# Patient Record
Sex: FEMALE | Race: BLACK OR AFRICAN AMERICAN | Employment: UNEMPLOYED | URBAN - METROPOLITAN AREA
[De-identification: names, ages, dates, MRNs, and addresses within clinical notes are randomized per-mention and may not be internally consistent; named-entity substitution may affect disease eponyms.]

---

## 2018-04-07 ENCOUNTER — HOSPITAL ENCOUNTER (INPATIENT)
Age: 48
LOS: 10 days | Discharge: HOME | DRG: 885 | End: 2018-04-17
Attending: EMERGENCY MEDICINE | Admitting: PSYCHIATRY & NEUROLOGY
Payer: MEDICARE

## 2018-04-07 DIAGNOSIS — R44.0 AUDITORY HALLUCINATION: Primary | ICD-10-CM

## 2018-04-07 PROBLEM — F25.9 SCHIZOAFFECTIVE DISORDER (HCC): Status: ACTIVE | Noted: 2018-04-07

## 2018-04-07 LAB
AMPHET UR QL SCN: NEGATIVE
ANION GAP SERPL CALC-SCNC: 9 MMOL/L (ref 3–18)
BARBITURATES UR QL SCN: NEGATIVE
BASOPHILS # BLD: 0 K/UL (ref 0–0.1)
BASOPHILS NFR BLD: 0 % (ref 0–2)
BENZODIAZ UR QL: NEGATIVE
BUN SERPL-MCNC: 10 MG/DL (ref 7–18)
BUN/CREAT SERPL: 10 (ref 12–20)
CALCIUM SERPL-MCNC: 9 MG/DL (ref 8.5–10.1)
CANNABINOIDS UR QL SCN: NEGATIVE
CHLORIDE SERPL-SCNC: 104 MMOL/L (ref 100–108)
CO2 SERPL-SCNC: 24 MMOL/L (ref 21–32)
COCAINE UR QL SCN: NEGATIVE
CREAT SERPL-MCNC: 1.04 MG/DL (ref 0.6–1.3)
DIFFERENTIAL METHOD BLD: NORMAL
EOSINOPHIL # BLD: 0.1 K/UL (ref 0–0.4)
EOSINOPHIL NFR BLD: 3 % (ref 0–5)
ERYTHROCYTE [DISTWIDTH] IN BLOOD BY AUTOMATED COUNT: 13.3 % (ref 11.6–14.5)
ETHANOL SERPL-MCNC: <3 MG/DL (ref 0–3)
GLUCOSE SERPL-MCNC: 90 MG/DL (ref 74–99)
HCT VFR BLD AUTO: 39.4 % (ref 35–45)
HDSCOM,HDSCOM: NORMAL
HGB BLD-MCNC: 12.8 G/DL (ref 12–16)
LYMPHOCYTES # BLD: 2.3 K/UL (ref 0.9–3.6)
LYMPHOCYTES NFR BLD: 46 % (ref 21–52)
MCH RBC QN AUTO: 28.3 PG (ref 24–34)
MCHC RBC AUTO-ENTMCNC: 32.5 G/DL (ref 31–37)
MCV RBC AUTO: 87 FL (ref 74–97)
METHADONE UR QL: NEGATIVE
MONOCYTES # BLD: 0.4 K/UL (ref 0.05–1.2)
MONOCYTES NFR BLD: 9 % (ref 3–10)
NEUTS SEG # BLD: 2 K/UL (ref 1.8–8)
NEUTS SEG NFR BLD: 42 % (ref 40–73)
OPIATES UR QL: NEGATIVE
PCP UR QL: NEGATIVE
PLATELET # BLD AUTO: 322 K/UL (ref 135–420)
PMV BLD AUTO: 10 FL (ref 9.2–11.8)
POTASSIUM SERPL-SCNC: 3.9 MMOL/L (ref 3.5–5.5)
RBC # BLD AUTO: 4.53 M/UL (ref 4.2–5.3)
SODIUM SERPL-SCNC: 137 MMOL/L (ref 136–145)
WBC # BLD AUTO: 4.9 K/UL (ref 4.6–13.2)

## 2018-04-07 PROCEDURE — 99285 EMERGENCY DEPT VISIT HI MDM: CPT

## 2018-04-07 PROCEDURE — 65220000005 HC RM SEMIPRIVATE PSYCH 3 OR 4 BED

## 2018-04-07 PROCEDURE — 85025 COMPLETE CBC W/AUTO DIFF WBC: CPT | Performed by: PHYSICIAN ASSISTANT

## 2018-04-07 PROCEDURE — 80048 BASIC METABOLIC PNL TOTAL CA: CPT | Performed by: PHYSICIAN ASSISTANT

## 2018-04-07 PROCEDURE — 80307 DRUG TEST PRSMV CHEM ANLYZR: CPT | Performed by: PHYSICIAN ASSISTANT

## 2018-04-07 NOTE — IP AVS SNAPSHOT
[address] [facility] Patient: Hayder Chavez MRN: [id number] SIX:[id number] About your hospitalization You were admitted on:  April 7, 2018 You last received care in the:  [facility] 1 ADULT CHEM DEP You were discharged on:  April 17, 2018 Why you were hospitalized Your primary diagnosis was:  Schizoaffective Disorder (Hcc) Follow-up Information Follow up With Details Comments Contact Info Pt. will follow-up with Navigation follow-up appointment for 5/1/18 @ 11:00 at [facility] at [address] [address] Phone: [phone] ext. [employer] 3351 Pt given card with numbers to remember. Discharge Orders None A check ulyssse indicates which time of day the medication should be taken. My Medications CHANGE how you take these medications Instructions Each Dose to Equal  
 Morning Noon Evening Bedtime  
 paliperidone palmitate 156 mg/mL injection Commonly known as:  Rosales Doljennifern Start taking on:  5/17/2018 What changed:   
- medication strength 
- how much to take - when to take this 
- additional instructions Your last dose was: Your next dose is:    
   
   
 1 mL by IntraMUSCular route once for 1 dose. Due 5/17/18, F25.9  Indications: SCHIZOAFFECTIVE DISORDER  
 156 mg CONTINUE taking these medications Instructions Each Dose to Equal  
 Morning Noon Evening Bedtime  
 hydrOXYzine pamoate 25 mg capsule Commonly known as:  VISTARIL Your last dose was: Your next dose is: Take 1 Cap by mouth three (3) times daily as needed for Anxiety. Indications: anxiety 25 mg  
    
   
   
   
  
  
STOP taking these medications   
 divalproex  mg ER tablet Commonly known as:  DEPAKOTE ER Where to Get Your Medications Information on where to get these meds will be given to you by the nurse or doctor. ! Ask your nurse or doctor about these medications  
  hydrOXYzine pamoate 25 mg capsule  
 paliperidone palmitate 156 mg/mL injection Discharge Instructions Emergency Numbers [facility] Desk: [phone] [place] [facility]: [phone] Suicide Prevention Line: [phone] (TALK) The following personal items collected during your admission are returned to you:  
Dental Appliance: Dental Appliances: None Vision: Visual Aid: None Hearing Aid:   
Jewelry: Jewelry: Bracelet, Earrings, Ring Clothing: Clothing: Jacket/Coat, Pants, Shirt, Slippers, Undergarments Other Valuables: Other Valuables: Money (comment), Purse, Other (comment) Valuables sent to safe: The discharge information has been reviewed with the patient. The patient verbalized understanding. [employer] Activation Thank you for requesting access to [employer]. Please follow the instructions below to securely access and download your online medical record. [employer] allows you to send messages to your doctor, view your test results, renew your prescriptions, schedule appointments, and more. How Do I Sign Up? In your internet browser, go to [email or web address] Click on the First Time User? Click Here link in the Sign In box. You will be redirect to the New Member Sign Up page. Enter your [employer] Access Code exactly as it appears below. You will not need to use this code after youve completed the sign-up process. If you do not sign up before the expiration date, you must request a new code. . 
 
 
 
  
  
  
[employer] Announcement We are excited to announce that we are making your provider's discharge notes available to you in [employer].   You will see these notes when they are completed and signed by the physician that discharged you from your recent hospital stay. If you have any questions or concerns about any information you see in [employer], please call the Health Information Department where you were seen or reach out to your Primary Care Provider for more information about your plan of care. Introducing [facility]! Josselyn Patel introduces [employer] patient portal. Now you can access parts of your medical record, email your doctor's office, and request medication refills online. 1. In your internet browser, go to [email or web address] 2. Click on the First Time User? Click Here link in the Sign In box. You will see the New Member Sign Up page. 3. Enter your [employer] Access Code exactly as it appears below. You will not need to use this code after youve completed the sign-up process. If you do not sign up before the expiration date, you must request a new code. · [employer] Access Code: [email or web address] Expires: 7/6/2018  4:32 PM 
 
4. Enter the last four digits of your Social Security Number (xxxx) and Date of Birth (mm/dd/yyyy) as indicated and click Submit. You will be taken to the next sign-up page. 5. Create a [employer] ID. This will be your [employer] login ID and cannot be changed, so think of one that is secure and easy to remember. 6. Create a [employer] password. You can change your password at any time. 7. Enter your Password Reset Question and Answer. This can be used at a later time if you forget your password. 8. Enter your e-mail address. You will receive e-mail notification when new information is available in [address] 9. Click Sign Up. You can now view and download portions of your medical record. 10. Click the Download Summary menu link to download a portable copy of your medical information. If you have questions, please visit the Frequently Asked Questions section of the [employer] website. Remember, [employer] is NOT to be used for urgent needs. For medical emergencies, dial 911. Now available from your iPhone and Android! Introducing Sanjay López As a [employer] patient, I wanted to make you aware of our electronic visit tool called Sanjay López. [employer] 24/7 allows you to connect within minutes with a medical provider 24 hours a day, seven days a week via a mobile device or tablet or logging into a secure website from your computer. You can access Sanjay López from anywhere in the [place]. A virtual visit might be right for you when you have a simple condition and feel like you just dont want to get out of bed, or cant get away from work for an appointment, when your regular [employer] provider is not available (evenings, weekends or holidays), or when youre out of town and need minor care. Electronic visits cost only $49 and if the [employer] 24/7 provider determines a prescription is needed to treat your condition, one can be electronically transmitted to a nearby pharmacy*. Please take a moment to enroll today if you have not already done so. The enrollment process is free and takes just a few minutes. To enroll, please download the [employer] 24/7 valdo to your tablet or phone, or visit [email or web address] (acquired by [employer]). org to enroll on your computer. And, as an [address] [place] [address] patient with a [employer] account, the results of your visits will be scanned into your electronic medical record and your primary care provider will be able to view the scanned results. We urge you to continue to see your regular [employer] provider for your ongoing medical care. And while your primary care provider may not be the one available when you seek a Sanjay López virtual visit, the peace of mind you get from getting a real diagnosis real time can be priceless. For more information on Sanjay López, view our Frequently Asked Questions (FAQs) at [email or web address] (acquired by [employer]). org. Sincerely, 
 
David Manzanares MD 
Chief Medical Officer [employer] *:  certain medications cannot be prescribed via Sanjay López Providers Seen During Your Hospitalization Provider Specialty Primary office phone Dieudonne Vinson MD Emergency Medicine [phone] Phill Hay MD Emergency Medicine [phone] Dean Wrihgt MD Emergency Medicine [phone] Ayala Edgar MD Psychiatry [phone] Your Primary Care Physician (PCP) Primary Care Physician Office Phone Office Fax UNKNOWN, PROVIDER ** None ** ** None ** You are allergic to the following Allergen Reactions Haldol (Haloperidol Lactate) Swelling Pork Derived (Porcine) Nausea and Vomiting Trazodone Unknown (comments) Recent Documentation Height Weight BMI Smoking Status 1.702 m 90.7 kg 31.32 kg/m2 Never Smoker Emergency Contacts Name Discharge Info Relation Home Work Mobile April Feliciano DISCHARGE CAREGIVER [3] Mother [14] [phone] Patient Belongings The following personal items are in your possession at time of discharge: 
  Dental Appliances: None  Visual Aid: None      Home Medications: None   Jewelry: Bracelet, Earrings, Ring  Clothing: Jacket/Coat, Pants, Shirt, Slippers, Undergarments    Other Valuables: Money (comment), Purse, Other (comment) Please provide this summary of care documentation to your next provider. Signatures-by signing, you are acknowledging that this After Visit Summary has been reviewed with you and you have received a copy. Patient Signature:  ____________________________________________________________ Date:  ____________________________________________________________  
  
Russell Be Provider Signature:  ____________________________________________________________ Date:  ____________________________________________________________

## 2018-04-07 NOTE — IP AVS SNAPSHOT
Summary of Care Report The Summary of Care report has been created to help improve care coordination. Users with access to [employer] or [email or web address] (Web-based application) may access additional patient information including the Discharge Summary. If you are not currently a [employer] user and need more information, please call the number listed below in the [place] [address] section and ask to be connected with Medical Records. Facility Information Name Address Phone [address] [facility] [address] [phone] Patient Information Patient Name Sex  Elvia Sesay ([id number]) Female 1970 Discharge Information Admitting Provider Service Area Unit Nikki Hill MD / Alejandra Soni [address] 1 Adult Chem Dep / [phone] Discharge Provider Discharge Date/Time Discharge Disposition Destination (none) 2018 (Pending) AHR (none) Patient Language Language ENGLISH [13] Hospital Problems as of 2018  Reviewed: 12/15/2012  2:56 PM by CHRISTINE Eagle Noted - Resolved Last Modified POA Active Problems * (Principal)Schizoaffective disorder ([facility] [address]  2018 - Present 2018 by Niall Varela MD Unknown Entered by Nikki Hill MD  
  
Non-Hospital Problems as of 2018  Reviewed: 12/15/2012  2:56 PM by CHRISTINE Eagle Noted - Resolved Last Modified Active Problems   Non-compliance with treatment  2014 - Present 2016 by Virginie Sanchez MD  
  Entered by Virginie Sanchez MD  
  PTSD (post-traumatic stress disorder)  2014 - Present 2016 by Virginie Sanchez MD  
  Entered by Virginie Sanchez MD  
  Obesity (BMI 30-39.9)  2016 - Present 2016 by Virginie Sanchez MD  
  Entered by Virginie Sanchez MD  
 Schizoaffective disorder, mixed type ([facility] [address]  6/19/2016 - Present 6/19/2016 by Chinedu Reddy MD  
  Entered by Chinedu Reddy MD  
  Essential hypertension with goal blood pressure less than 140/90  6/21/2016 - Present 6/21/2016 by Chinedu Reddy MD  
  Entered by Chinedu Reddy MD  
  
You are allergic to the following Allergen Reactions Haldol (Haloperidol Lactate) Swelling Pork Derived (Porcine) Nausea and Vomiting Trazodone Unknown (comments) Current Discharge Medication List  
  
CONTINUE these medications which have CHANGED Dose & Instructions Dispensing Information Comments  
 paliperidone palmitate 156 mg/mL injection Commonly known as:  Colleen Messier Start taking on:  5/17/2018 What changed:   
- medication strength 
- how much to take - when to take this 
- additional instructions Dose:  156 mg  
1 mL by IntraMUSCular route once for 1 dose. Due 5/17/18, F25.9  Indications: SCHIZOAFFECTIVE DISORDER Quantity:  1 mL Refills:  0 CONTINUE these medications which have NOT CHANGED Dose & Instructions Dispensing Information Comments  
 hydrOXYzine pamoate 25 mg capsule Commonly known as:  VISTARIL Dose:  25 mg Take 1 Cap by mouth three (3) times daily as needed for Anxiety. Indications: anxiety Quantity:  90 Cap Refills:  0 STOP taking these medications Comments  
 divalproex  mg ER tablet Commonly known as:  DEPAKOTE ER Follow-up Information Follow up With Details Comments Contact Info Pt. will follow-up with Navigation follow-up appointment for 5/1/18 @ 11:00 at [facility] at [address] [place] [address] Phone: [phone] ext. Micheline 7068 Pt given card with numbers to remember. Discharge Instructions Emergency Numbers [facility] Desk: [phone] [place] [facility]: [phone] Suicide Prevention Line: [phone] (TALK) The following personal items collected during your admission are returned to you:  
Dental Appliance: Dental Appliances: None Vision: Visual Aid: None Hearing Aid:   
Jewelry: Jewelry: Bracelet, Earrings, Ring Clothing: Clothing: Jacket/Coat, Pants, Shirt, Slippers, Undergarments Other Valuables: Other Valuables: Money (comment), Purse, Other (comment) Valuables sent to safe: The discharge information has been reviewed with the patient. The patient verbalized understanding. [employer] Activation Thank you for requesting access to [employer]. Please follow the instructions below to securely access and download your online medical record. [employer] allows you to send messages to your doctor, view your test results, renew your prescriptions, schedule appointments, and more. How Do I Sign Up? In your internet browser, go to [email or web address] Click on the First Time User? Click Here link in the Sign In box. You will be redirect to the New Member Sign Up page. Enter your [employer] Access Code exactly as it appears below. You will not need to use this code after youve completed the sign-up process. If you do not sign up before the expiration date, you must request a new code. . 
 
 
 
Chart Review Routing History Recipient Method Report Sent By Tawanda Delacruz None [address] Mail IP Auto Routed Seamus Grayson MD [[id number]] 6/25/2014 11:50 AM 06/25/2014 None [address] Mail IP Auto Routed Notes Deepika Boothe MD [[id number]] 6/30/2014 12:24 PM 06/30/2014 None [address] Mail IP Auto Routed Seamus Grayson MD [[id number]] 7/3/2014  2:47 PM 07/03/2014 Rashid Caballero MD  
Phone: [phone] In Ufree Routed PRIYANK Caraballo MD [[id number]] 6/23/2015  9:06 AM 06/23/2015 Provider Unknown, MD  
Patient not available to ask [address] Mail IP Auto Routed Notes Joe Dixon MD [[id number]] 7/6/2016  6:55 AM 07/06/2016

## 2018-04-07 NOTE — ED TRIAGE NOTES
Patient is paranoid and feels like someone from [facility]  Is attacking her. Patient denies SI/HI.

## 2018-04-07 NOTE — ED PROVIDER NOTES
EMERGENCY DEPARTMENT HISTORY AND PHYSICAL EXAM    4:53 PM      Date: 4/7/2018  Patient Name: Jami Marie    History of Presenting Illness     Chief Complaint   Patient presents with   [address] Problem         History Provided By: Patient    Chief Complaint: \"someone at [facility] is after me\"  Duration:  Days  Timing:  Acute  Location: n/a  Quality: n/a  Severity: Moderate  Modifying Factors: none  Associated Symptoms: anxious      Additional History (Context): Jami Marie is a 56 y.o. female with major depression, atypical psychosis, and anxiety who presents with c/o \"someone from [facility] is after me\". \"God blessed me by telling me this\". Pt notes \"I feel anxious and not right\". Denies SI/HI, visual hallucinations. Notes she has been taking her prescribed medication and took \"something today for anxiety\". PCP: PROVIDER UNKNOWN    Current Outpatient Prescriptions   Medication Sig Dispense Refill    divalproex ER (DEPAKOTE ER) 500 mg ER tablet Take 2 Tabs by mouth nightly. Indications: MIXED BIPOLAR I DISORDER 28 Tab 0    paliperidone palmitate (INVEGA SUSTENNA) 234 mg/1.5 mL injection 1.5 mL by IntraMUSCular route every thirty (30) days. Patient to get this Rx for depot medication filled and administered by outpatient doctor/clinic. First dose due 7/22/16. Indications: SCHIZOAFFECTIVE DISORDER 1 Syringe 0       Past History     Past Medical History:  Past Medical History:   Diagnosis Date    Major depression     Noncompliance with treatment     PTSD (post-traumatic stress disorder)     Schizophrenia ([facility] [address]        Past Surgical History:  No past surgical history on file. Family History:  Family History   Problem Relation Age of Onset    Psychotic Disorder Brother      per mother       Social History:  Social History   Substance Use Topics    Smoking status: Never Smoker    Smokeless tobacco: Not on file    Alcohol use No       Allergies:   Allergies   Allergen Reactions    Haldol [Haloperidol Lactate] Swelling    Trazodone Unknown (comments)         Review of Systems       Review of Systems   Constitutional: Negative for chills and fever. Respiratory: Negative for shortness of breath. Cardiovascular: Negative for chest pain. Gastrointestinal: Negative for abdominal pain, nausea and vomiting. Skin: Negative for rash. Neurological: Negative for weakness. Psychiatric/Behavioral: Positive for confusion and hallucinations. Negative for agitation and suicidal ideas. The patient is nervous/anxious. All other systems reviewed and are negative. Physical Exam     Visit Vitals    /60 (BP 1 Location: Left arm, BP Patient Position: At rest)    Pulse 81    Temp 97.4 °F (36.3 °C)    Resp 16    SpO2 93%         Physical Exam   Constitutional: She is oriented to person, place, and time. She appears well-developed and well-nourished. No distress. HENT:   Head: Normocephalic and atraumatic. Neck: Normal range of motion. Neck supple. Cardiovascular: Normal rate, regular rhythm, normal heart sounds and intact distal pulses. Exam reveals no gallop and no friction rub. No murmur heard. Pulmonary/Chest: Effort normal and breath sounds normal. No respiratory distress. She has no wheezes. She has no rales. Neurological: She is alert and oriented to person, place, and time. No cranial nerve deficit. Coordination normal.   Skin: Skin is warm. No rash noted. She is not diaphoretic. Psychiatric: Her mood appears anxious. She is slowed. Thought content is paranoid. She expresses no homicidal and no suicidal ideation. Nursing note and vitals reviewed.         Diagnostic Study Results     Labs -  Recent Results (from the past 12 hour(s))   DRUG SCREEN, URINE    Collection Time: 04/07/18  6:02 PM   Result Value Ref Range    BENZODIAZEPINES NEGATIVE  NEG      BARBITURATES NEGATIVE  NEG      THC (TH-CANNABINOL) NEGATIVE  NEG      OPIATES NEGATIVE  NEG      PCP(PHENCYCLIDINE) NEGATIVE  NEG      COCAINE NEGATIVE  NEG      AMPHETAMINES NEGATIVE  NEG      METHADONE NEGATIVE  NEG      HDSCOM (NOTE)    CBC WITH AUTOMATED DIFF    Collection Time: 04/07/18  6:12 PM   Result Value Ref Range    WBC 4.9 4.6 - 13.2 K/uL    RBC 4.53 4.20 - 5.30 M/uL    HGB 12.8 12.0 - 16.0 g/dL    HCT 39.4 35.0 - 45.0 %    MCV 87.0 74.0 - 97.0 FL    MCH 28.3 24.0 - 34.0 PG    MCHC 32.5 31.0 - 37.0 g/dL    RDW 13.3 11.6 - 14.5 %    PLATELET 268 810 - 850 K/uL    MPV 10.0 9.2 - 11.8 FL    NEUTROPHILS 42 40 - 73 %    LYMPHOCYTES 46 21 - 52 %    MONOCYTES 9 3 - 10 %    EOSINOPHILS 3 0 - 5 %    BASOPHILS 0 0 - 2 %    ABS. NEUTROPHILS 2.0 1.8 - 8.0 K/UL    ABS. LYMPHOCYTES 2.3 0.9 - 3.6 K/UL    ABS. MONOCYTES 0.4 0.05 - 1.2 K/UL    ABS. EOSINOPHILS 0.1 0.0 - 0.4 K/UL    ABS. BASOPHILS 0.0 0.0 - 0.1 K/UL    DF AUTOMATED     METABOLIC PANEL, BASIC    Collection Time: 04/07/18  6:12 PM   Result Value Ref Range    Sodium 137 136 - 145 mmol/L    Potassium 3.9 3.5 - 5.5 mmol/L    Chloride 104 100 - 108 mmol/L    CO2 24 21 - 32 mmol/L    Anion gap 9 3.0 - 18 mmol/L    Glucose 90 74 - 99 mg/dL    BUN 10 7.0 - 18 MG/DL    Creatinine 1.04 0.6 - 1.3 MG/DL    BUN/Creatinine ratio 10 (L) 12 - 20      GFR est AA >60 >60 ml/min/1.73m2    GFR est non-AA 57 (L) >60 ml/min/1.73m2    Calcium 9.0 8.5 - 10.1 MG/DL   ETHYL ALCOHOL    Collection Time: 04/07/18  6:12 PM   Result Value Ref Range    ALCOHOL(ETHYL),SERUM <3 0 - 3 MG/DL       Radiologic Studies -   No orders to display         Medical Decision Making   I am the first provider for this patient. I reviewed the vital signs, available nursing notes, past medical history, past surgical history, family history and social history. Vital Signs-Reviewed the patient's vital signs.       Records Reviewed: Nursing Notes and Old Medical Records (Time of Review: 4:53 PM)    ED Course: Progress Notes, Reevaluation, and Consults:  CONSULT NOTE:   7:14 PM  I spoke with Bonni Claude  Specialty: Crisis  Discussed pt's hx, disposition, and available diagnostic and imaging results. Reviewed care plans. Consulting physician agrees with plans as outlined. Nadia will be evaluating patient. Written by Forrest Son PA-C    7:19 PM: Updated patient with plan     PROGRESS NOTE:  9:00PM:   Patient care will be transferred to Colton Eunice. Discussed available diagnostic results and care plan at length. Pending crisis evaluation and dispo. Written by Forrest Son PA-C      Diagnosis     Clinical Impression:   1. Auditory hallucination        Disposition: TBD     Follow-up Information     None           Patient's Medications   Start Taking    No medications on file   Continue Taking    DIVALPROEX ER (DEPAKOTE ER) 500 MG ER TABLET    Take 2 Tabs by mouth nightly. Indications: MIXED BIPOLAR I DISORDER    PALIPERIDONE PALMITATE (INVEGA SUSTENNA) 234 MG/1.5 ML INJECTION    1.5 mL by IntraMUSCular route every thirty (30) days. Patient to get this Rx for depot medication filled and administered by outpatient doctor/clinic. First dose due 7/22/16.   Indications: SCHIZOAFFECTIVE DISORDER   These Medications have changed    No medications on file   Stop Taking    No medications on file

## 2018-04-08 PROCEDURE — 65220000003 HC RM SEMIPRIVATE PSYCH

## 2018-04-08 RX ORDER — HYDROXYZINE PAMOATE 25 MG/1
25 CAPSULE ORAL
Status: ON HOLD | COMMUNITY
End: 2018-04-17

## 2018-04-08 RX ORDER — HYDROXYZINE PAMOATE 25 MG/1
25 CAPSULE ORAL
Status: DISCONTINUED | OUTPATIENT
Start: 2018-04-08 | End: 2018-04-12

## 2018-04-08 NOTE — PROGRESS NOTES
Problem: Falls - Risk of  Goal: *Absence of Falls  Document [place] Fall Risk and appropriate interventions in the flowsheet. Outcome: Progressing Towards Goal  Fall Risk Interventions:   absent of falls daily                            Problem: Depressed Mood (Adult/Pediatric)  Goal: *STG: Participates in treatment plan  Outcome: Progressing Towards Goal  Participates in treatment plan daily. Goal: *STG: Remains safe in hospital  Outcome: Progressing Towards Goal  Remains safe in hospital daily. Comments: Denies SI HI VH. Presents with AH. Delusions. Gripper socks and 15 minute checks in place for safety. Offers no complaints. Quiet keeps to self in room. Eats and tolerates evening meal. Takes medicines without incident. Will continue to monitor and support. Patient transferred to Adult Unit. Nurse Kassie Gonsales RN. Received patient report and her belongings. Uneventful.

## 2018-04-08 NOTE — PROGRESS NOTES
Problem: Depressed Mood (Adult/Pediatric)  Goal: *STG: Participates in 1:1 therapy sessions  Outcome: Progressing Towards Goal  aeb cooperative during admission assessment.
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
## 2018-04-08 NOTE — BH NOTES
GROUP THERAPY PROGRESS NOTE    Isabela Bach was encouraged but did not participate in Relaxation group.
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
## 2018-04-08 NOTE — ED NOTES
TRANSFER - OUT REPORT:    Verbal report given to Kaity RN(name) on Zhangdeidre Gilford  being transferred to Adult Unit(unit) for routine progression of care     Report consisted of patients Situation, Background, Assessment and Recommendations(SBAR). Information from the following report(s) SBAR, ED Summary, Procedure Summary, MAR and Recent Results was reviewed with the receiving nurse. Opportunity for questions and clarification was provided.       Patient transported with: [employer]
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
## 2018-04-09 PROCEDURE — 74011250637 HC RX REV CODE- 250/637: Performed by: PSYCHIATRY & NEUROLOGY

## 2018-04-09 PROCEDURE — 65220000003 HC RM SEMIPRIVATE PSYCH

## 2018-04-09 RX ORDER — LORAZEPAM 1 MG/1
2 TABLET ORAL
Status: DISCONTINUED | OUTPATIENT
Start: 2018-04-09 | End: 2018-04-12

## 2018-04-09 RX ORDER — FLUPHENAZINE HYDROCHLORIDE 2.5 MG/ML
5 INJECTION, SOLUTION INTRAMUSCULAR
Status: DISCONTINUED | OUTPATIENT
Start: 2018-04-09 | End: 2018-04-17 | Stop reason: HOSPADM

## 2018-04-09 RX ORDER — BENZTROPINE MESYLATE 1 MG/1
1-2 TABLET ORAL
Status: DISCONTINUED | OUTPATIENT
Start: 2018-04-09 | End: 2018-04-12

## 2018-04-09 RX ORDER — LORAZEPAM 1 MG/1
1 TABLET ORAL
Status: DISCONTINUED | OUTPATIENT
Start: 2018-04-09 | End: 2018-04-17 | Stop reason: HOSPADM

## 2018-04-09 RX ORDER — BENZTROPINE MESYLATE 1 MG/ML
1-2 INJECTION INTRAMUSCULAR; INTRAVENOUS
Status: DISCONTINUED | OUTPATIENT
Start: 2018-04-09 | End: 2018-04-12

## 2018-04-09 RX ORDER — LORAZEPAM 2 MG/ML
1 INJECTION INTRAMUSCULAR
Status: DISCONTINUED | OUTPATIENT
Start: 2018-04-09 | End: 2018-04-17 | Stop reason: HOSPADM

## 2018-04-09 RX ORDER — LORAZEPAM 2 MG/ML
2 INJECTION INTRAMUSCULAR
Status: DISCONTINUED | OUTPATIENT
Start: 2018-04-09 | End: 2018-04-12

## 2018-04-09 RX ORDER — PALIPERIDONE 3 MG/1
3 TABLET, EXTENDED RELEASE ORAL DAILY
Status: DISCONTINUED | OUTPATIENT
Start: 2018-04-09 | End: 2018-04-11

## 2018-04-09 RX ORDER — FLUPHENAZINE HYDROCHLORIDE 5 MG/1
5 TABLET ORAL
Status: DISCONTINUED | OUTPATIENT
Start: 2018-04-09 | End: 2018-04-17 | Stop reason: HOSPADM

## 2018-04-09 RX ADMIN — PALIPERIDONE 3 MG: 3 TABLET, FILM COATED, EXTENDED RELEASE ORAL at 15:06
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
## 2018-04-09 NOTE — BH NOTES
Arshdianne Willie is  participating in Treatment Concerns     Group time: 1700    Personal goal for participation: Community announcement    Goal orientation: community    Group therapy participation: fully participated    Therapeutic interventions reviewed and discussed: Staff discussed  Staff discussed the Mental Health programs offered. Unit schedule for groups,  Visiting hours, patient advocate name and phone number and where this information is posted on the unit, etc. Report any maintenance/housekeeping or treatment concerns to staff so it can be addressed by the Treatment Team.    Impression of participation: Pt.did not have any maintenance/housekeeping or treatment concerns to report to staff .

## 2018-04-09 NOTE — BSMART NOTE
SOCIAL WORK GROUP THERAPY PROGRESS NOTE    Group Time:  11am    Group Topic:  Coping Skills    Group Participation:     Pt expressed not interested in attending session despite staff support

## 2018-04-09 NOTE — BH NOTES
Pt in room most of the shift. When talking with patient was very paranoid and  Thinking   people were after her. She took care of hygiene and had clothing washed and returned. She   did not participate in any activities. Will continue to monitor and encourage to take part in treatment. Safety on unit by approaching staff with any issues or questions.

## 2018-04-09 NOTE — PROGRESS NOTES
Problem: Depressed Mood (Adult/Pediatric)  Goal: *STG: Remains safe in hospital  Will remain safe and not engage in any self injurious behaviors daily during her hospital stay. Outcome: Progressing Towards Goal  Has remained safe and free of harm during shift of care. Goal: *STG: Complies with medication therapy  Will be compliant with scheduled medications daily as ordered during her hospitalization. Outcome: Progressing Towards Goal  Patient was compliant with medication regiment. Problem: Altered Thought Process (Adult/Pediatric)  Goal: *STG: Decreased delusional thinking  Will have decreased or no delusional thinking daily during her hospital stay. Outcome: Not Progressing Towards Goal  Continues to endorse delusional thinking. Comments: Patient appears disheveled. Patient presents with a dull affect, and anxious-sad mood. During 1:1, patient denied SI/HI, but continues to endorse AH and delusions  of being controlled by an entity named Susana Ortiz (spelling). Patient remains paranoid that this individual will figure out a way of \"reaching\" her while she is here. Patient was compliant with medication regiment; requires reinforcement of education regarding medications. Patient has remained in room for majority of the shift; leaving room for meals. Patient has verbally contracted for safety; agreed to seek staff if feeling in danger. Patient has not engaged in any hazardous behavior that may result in a fall or injury.
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
## 2018-04-10 PROCEDURE — 74011250637 HC RX REV CODE- 250/637: Performed by: PSYCHIATRY & NEUROLOGY

## 2018-04-10 PROCEDURE — 65220000003 HC RM SEMIPRIVATE PSYCH

## 2018-04-10 RX ADMIN — PALIPERIDONE 3 MG: 3 TABLET, FILM COATED, EXTENDED RELEASE ORAL at 08:29

## 2018-04-10 RX ADMIN — LORAZEPAM 2 MG: 1 TABLET ORAL at 15:49

## 2018-04-10 NOTE — BH NOTES
GROUP THERAPY PROGRESS NOTE    Mingo Murillo is not participating in [place].      Group time: 30 minutes    Personal goal for participation: none    Goal orientation: community    Group therapy participation: refused    Therapeutic interventions reviewed and discussed: goals and procedures    Impression of participation: encouraged

## 2018-04-10 NOTE — BSMART NOTE
OCCUPATIONAL THERAPY PROGRESS NOTE  Group Time:  7412  Attendance: The patient attended 1/3 of group. Participation:  The patient participated with minimal elaboration in the activity. Interaction:  The patient acknowledges others or responds to questions,  with no spontaneous interaction. Patient identified activity goal of learning self-defense, did not explain this and discussed various ways this can be achieved. Affect blunted. Minimal interaction. Left to return to room.
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
## 2018-04-10 NOTE — BSMART NOTE
SOCIAL WORK GROUP THERAPY PROGRESS NOTE    Group Time:  11am    Group Topic:  Coping Skills    C D Issues    Group Participation:       Pt minimally involved during group discussion but remained attentive although only came for last x10 minutes. Flat affect, dysphoric mood. Members discussed handout on the role of \"neurotransmitters\" and how they regulate different aspects of one's moods, cognitions & related behavior.

## 2018-04-10 NOTE — PROGRESS NOTES
Problem: Falls - Risk of  Goal: *Absence of Falls  Document [place] Fall Risk and appropriate interventions in the flowsheet. Will be free of falls during her hospital stay. Outcome: Progressing Towards Goal  Fall Risk Interventions:absent of falls daily                               Problem: Depressed Mood (Adult/Pediatric)  Goal: *STG: Remains safe in hospital  Will remain safe and not engage in any self injurious behaviors daily during her hospital stay. Outcome: Progressing Towards Goal  Remains safe in hospital daukt,  Goal: *STG: Complies with medication therapy  Will be compliant with scheduled medications daily as ordered during her hospitalization. Outcome: Progressing Towards Goal  Complies with medication therapy daily. Comments: Denies SI HI. Positive for AVH. Patient thinks someone is out to get her. Paranoid withdrawn anxious. Eats and tolerates evening meal. Gripper socks and 15 minute checks in place for safety. Takes medicines without incident. Will continue to monitor and support.
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
## 2018-04-10 NOTE — BH NOTES
GROUP THERAPY PROGRESS NOTE    [address] participating in Recreational Therapy.      Group time: 1 hour    Personal goal for participation: The restoration of health and wellness in recreational therapy    Goal orientation: social    Group therapy participation: active    Therapeutic interventions reviewed and discussed: How to restore and interact in recreational therapy    Impression of participation: Patient has fully participated

## 2018-04-11 PROCEDURE — 65220000003 HC RM SEMIPRIVATE PSYCH

## 2018-04-11 PROCEDURE — 74011250637 HC RX REV CODE- 250/637: Performed by: PSYCHIATRY & NEUROLOGY

## 2018-04-11 RX ORDER — PALIPERIDONE 3 MG/1
6 TABLET, EXTENDED RELEASE ORAL DAILY
Status: DISCONTINUED | OUTPATIENT
Start: 2018-04-12 | End: 2018-04-12

## 2018-04-11 RX ADMIN — PALIPERIDONE 3 MG: 3 TABLET, FILM COATED, EXTENDED RELEASE ORAL at 08:48

## 2018-04-11 NOTE — PROGRESS NOTES
Problem: Falls - Risk of  Goal: *Absence of Falls  Document Dakota Fall Risk and appropriate interventions in the flowsheet. Will be free of falls during her hospital stay. Outcome: Progressing Towards Goal  Fall Risk Interventions:  Pt remains free of falls. Problem: Depressed Mood (Adult/Pediatric)  Goal: *STG: Attends activities and groups  Will attend at least 2-3 groups gaily during her hospitalization. Outcome: Not Progressing Towards Goal  Pt is not attending groups today. Goal: *STG: Complies with medication therapy  Will be compliant with scheduled medications daily as ordered during her hospitalization. Outcome: Progressing Towards Goal  Pt takes medications as ordered. Comments: Patient has been mainly in her room today and refused to come out for vitals or breakfast this morning. Patient was pleasant upon approach however and is compliant with medications. Patient does not offer much information when spoken to but did want her Invega today. Patient denies SI/HI and AVH but continues to exhibit some symptoms of paranoia that people are out to get her. Patient otherwise expresses no concerns.

## 2018-04-11 NOTE — BH NOTES
Lieutenant Dover is not participating in Recreational Therapy. Group time: 1 hour    Personal goal for participation: fresh air break    Goal orientation: social    Group therapy participation: refuse    Therapeutic interventions reviewed and discussed: Staff encouraged Pt.  To participate in group    Impression of participation: Pt refuse, chose to rest in bed despite staff encouragement
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
## 2018-04-11 NOTE — BH NOTES
Patient ate breakfast and lunch. She has isolated to her room only coming out for meals. She does not have scheduled medications. She has no participated in groups. Staff will continue to monitor q15 minutes for safety. Staff and nurses will continue to provide a safe and supportive environment.

## 2018-04-11 NOTE — BSMART NOTE
ACTIVITIES THERAPY PROGRESS NOTE    Group time:1430  . The patient did not get up when called for group.

## 2018-04-11 NOTE — BH NOTES
Treatment team met -     Medical Director: _____present   Psychiatrist: __x___present   Charge nurse: _x____present   MSW: _x____present   : __x___present   Nurse Manager: _x____present   Student RNs: _____present   Medical Students: _____present   Art Therapist: __x___present   Clinical Coordinator: __x___present   Internal : _x___present   Occupational Therapist: _x____present   : __x_____ present  UR  x  present    Plan of care discussed and updated as appropriate.

## 2018-04-11 NOTE — BH NOTES
GROUP THERAPY PROGRESS NOTE    Isabela Bach is not  participating in [place]. Group time: 15 minutes  Pt in room entire morning refusing vital signs and breakfast and group.
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
## 2018-04-12 PROCEDURE — 65220000003 HC RM SEMIPRIVATE PSYCH

## 2018-04-12 PROCEDURE — 74011250636 HC RX REV CODE- 250/636: Performed by: PSYCHIATRY & NEUROLOGY

## 2018-04-12 PROCEDURE — 74011250637 HC RX REV CODE- 250/637: Performed by: PSYCHIATRY & NEUROLOGY

## 2018-04-12 RX ORDER — BENZTROPINE MESYLATE 1 MG/1
1 TABLET ORAL
Status: DISCONTINUED | OUTPATIENT
Start: 2018-04-12 | End: 2018-04-17 | Stop reason: HOSPADM

## 2018-04-12 RX ORDER — BENZTROPINE MESYLATE 1 MG/ML
1 INJECTION INTRAMUSCULAR; INTRAVENOUS
Status: DISCONTINUED | OUTPATIENT
Start: 2018-04-12 | End: 2018-04-17 | Stop reason: HOSPADM

## 2018-04-12 RX ORDER — HYDROXYZINE PAMOATE 50 MG/1
50 CAPSULE ORAL
Status: DISCONTINUED | OUTPATIENT
Start: 2018-04-12 | End: 2018-04-17 | Stop reason: HOSPADM

## 2018-04-12 RX ORDER — LORAZEPAM 2 MG/ML
1 INJECTION INTRAMUSCULAR
Status: DISCONTINUED | OUTPATIENT
Start: 2018-04-12 | End: 2018-04-12

## 2018-04-12 RX ADMIN — PALIPERIDONE PALMITATE 234 MG: 234 INJECTION INTRAMUSCULAR at 18:10

## 2018-04-12 RX ADMIN — PALIPERIDONE 6 MG: 3 TABLET, EXTENDED RELEASE ORAL at 08:23

## 2018-04-12 NOTE — BH NOTES
Basim Galo is not participating in Recreational Therapy. Group time: 6949    Personal goal for participation: fresh air break    Goal orientation: social    Group therapy participation: refuse    Therapeutic interventions reviewed and discussed: Staff encouraged Pt.  To participate in group    Impression of participation: Pt refuse, chose to rest in bed despite staff encouragement
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
## 2018-04-12 NOTE — BSMART NOTE
OCCUPATIONAL THERAPY PROGRESS NOTE  Group Time:  4787  Attendance: The patient attended 3/4 of group. Participation:  The patient participated with moderate elaboration in the activity. Attention:  The patient was able to focus on the activity. Interaction:  The patient acknowledges others or responds to questions,  with no spontaneous interaction. Able to ID goals for self to work on, some related to getting a job.

## 2018-04-12 NOTE — BH NOTES
Francisco J Johnie is not participating in [address] Group    Group time: 1hour    Personal goal for participation:  Seek information on Alcohol      Goal orientation: passive    Group therapy participation:   refuse    Therapeutic interventions reviewed and discussed: Staff encouraged Pt. To participate in Group    Impression of participation:   Pt.  Refuse, chose to sat in day area and color

## 2018-04-12 NOTE — BH NOTES
GROUP THERAPY PROGRESS NOTE    Nidia Chiu is participating in [place]. Group time: 30 minutes    Personal goal for participation: discussed discharge planning    Goal orientation: personal    Group therapy participation: passive    Therapeutic interventions reviewed and discussed:     Impression of participation: Sat in group appearing to be somewhat better compared to yesterday   and able to show more expression. Voiced no major complaints.

## 2018-04-12 NOTE — BSMART NOTE
Pt.is a 56year old female   with history of Schizoaffective Disorder, anxiety and PTSD.  Pt. Was admitted to this  facility for medication non-compliance, paranoia  And thought blocking.      SW discussed case with treating psychiatrist.  Zara Workman will find out if pt. received [place] shot on 3/14/18. SW talked to Mr. Silvia Corrales transition coordinator at [facility]. The transition coordinator stated pt had not been to the [facility] to get a shot. The transition coordinator also made SW aware of bed status. The [facility]  is currently on diversion, there are no available beds. SW informed the treating psychiatrist.     SW Contact:   LLOYD met with pt to discuss d/c planning. The pt. Expressed she is no longer anxious. Pt. Stated she has a headache and was feeling nauseated by the medications. Pt. Discussed the concern with the treating psychiatrist. The pt. expressed she was starting to feel better. Pt. stated sleep is improving. Pt. Denies ideations and hallucinations. Pt. Is goal oriented   Pt. was made aware [facility] does not have any beds available at this time. Pt. cooperative and goal oriented. D/C Plan: Pt . Stated she plans to return to the hot in [place], [place]. Pt stated she will reside with her  father.

## 2018-04-12 NOTE — BH NOTES
Patient is pleasant and cooperative on the unit today and has been more visible on the unit today. Patient is compliant with medications and offers no concerns. Patient denies SI/HI and AVH.
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
## 2018-04-12 NOTE — BSMART NOTE
SOCIAL WORK GROUP THERAPY PROGRESS NOTE    Group Time:  11am    Group Topic:  Coping Skills    C D Issues    Group Participation:      Pt moderately involved during group discussion but remained attentive. Flat affect, dysphoric & sleepy but fought thru & stayed entire session. \"Seven Steps\" for taking responsibility for our Happiness was reviewed including commitment to change, self-care, setting limits, goal setting & letting go. Pt admits need to build stronger support network. Also she allows others behavior to impact her too much.

## 2018-04-13 PROCEDURE — 65220000003 HC RM SEMIPRIVATE PSYCH

## 2018-04-13 NOTE — PROGRESS NOTES
Problem: Falls - Risk of  Goal: *Absence of Falls  Document Dakota Fall Risk and appropriate interventions in the flowsheet. Will be free of falls during her hospital stay. Outcome: Progressing Towards Goal  Fall Risk Interventions:  Pt remains free of falls. Problem: Depressed Mood (Adult/Pediatric)  Goal: *STG: Remains safe in hospital  Will remain safe and not engage in any self injurious behaviors daily during her hospital stay. Outcome: Progressing Towards Goal  Pt remains safe. Problem: Altered Thought Process (Adult/Pediatric)  Goal: *STG: Decreased hallucinations  Will have decreased or no hallucinations daily during her hospitalization. Outcome: Progressing Towards Goal  Pt denies AVH. Comments: Patient has been mainly in her room today resting but patient comes out for meals and medications. Patient is present on approach but only answers questions asked and will not elaborate except to deny SI/HI and endorse AVH.
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
## 2018-04-13 NOTE — PROGRESS NOTES
NUTRITION    Nutrition Screen    RECOMMENDATIONS / PLAN:     - Update food allergy. - Update pt's height and weight.  - Continue RD inpatient monitoring and evaluation. NUTRITION DIAGNOSIS & INTERVENTIONS:     [x] Meals/snacks: modify composition    Nutrition Diagnosis:  Obese related to prolonged excessive energy intake as evidenced by BMI of 31.4 kg/(m^2). ASSESSMENT:     Pt tolerating diet with good meal intake and appetite. Pt reports allergy to pork that results in N/V. Pt reports having a hx of DM2. Glucose lab of 90 mg/dL on 4/7/18. Discussed pt with glycemic control. Average intake adequate to meet patients estimated nutritional needs:   [x] Yes     [] No      [] Unable to determine at this time    Diet: DIET REGULAR    Food Allergies: Pork  Current Appetite:   [x] Good     [] Fair     [] Poor     [] Other:  Appetite/meal intake prior to admission:   [x] Good     [] Fair     [] Poor     [] Other:   Feeding Limitations:  [] Swallowing Difficulty       [] Chewing Difficulty       [] Other   Current Meal Intake: No data found. Gastrointestinal Issues:  [] Yes    [x] No   Skin Integrity:  WDL    Pertinent Medications:  Reviewed   Labs:  Reviewed     Anthropometrics:  Ht Readings from Last 1 Encounters:   06/19/16 5' 8\" (1.727 m)       There were no vitals filed for this visit. There is no height or weight on file to calculate BMI. BMI of 31.42 kg/(m^2) based on pt's stated height and weight. Plan to update chart.     Weight History:   Weight Metrics 6/19/2016 6/18/2016 6/17/2015 6/17/2015 6/22/2014 12/14/2012   Weight 175 lb 157 lb 170 lb 179 lb 3.7 oz 173 lb 160 lb   BMI 26.61 kg/m2 24.58 kg/m2 27.45 kg/m2 27.26 kg/m2 26.31 kg/m2 25.05 kg/m2       Admitting Diagnosis: Schizoaffective disorder ([facility]  Past Medical History:   Diagnosis Date    Major depression     Noncompliance with treatment     PTSD (post-traumatic stress disorder)     Schizophrenia ([facility]         Education Needs:        [x] None identified  [] Identified - Not appropriate at this time  []  Identified and addressed - refer to education log  Learning Limitations:   [x] None identified  [] Identified    Cultural, Taoist & ethnic food preferences identified:  [x] None    [] Yes      ESTIMATED NUTRITION NEEDS:     2837-1356 kcal (MSJx1.2-1.3), 73-91 gm protein (0.8-1 gm/kg), 1 mL/kcal  Based on: 91 kg (pt stated weight)      [x] Actual BW      [] IBW          MONITORING & EVALUATION:     Nutrition Goal(s):   1. Po intake of meals will meet >75% of patient estimated nutritional needs within the next 7 days. Outcome:   [] Met    []  Not Met   [x] New/Initial Goal  2. Weight loss of 1-2 lbs over the next 7 days.    Outcome:  [] Met/Ongoing    []  Not Met    [x] New/Initial Goal     Monitor:  [x] Food and beverage intake   [x] Diet order   [x] Nutrition-focused physical findings   [] Weight      Previous Recommendations (for follow-up assessments only):     []   Implemented       []   Not Implemented (RD to address)   [] No Longer Appropriate   [] No Recommendation Made       Discharge Planning: regular diet   [x]  Participated in care planning, discharge planning, & interdisciplinary rounds as appropriate      Elvira Donaldson RD   Pager: [phone]

## 2018-04-13 NOTE — BH NOTES
GROUP THERAPY PROGRESS NOTE    Guillermina Rich is participating in Recreational Therapy. Group time: 1700    Personal goal for participation: fresh air break/games on the unit    Goal orientation: social    Group therapy participation: active    Therapeutic interventions reviewed and discussed: Staff encouraged Pt to get off the unit and go outside and get some fresh air, or play games on the unit with peers. Impression of participation: Pt chose to go off the unit play games in the recreation room, and walk outside for fresh air.

## 2018-04-13 NOTE — BH NOTES
Patient came to desk asking to speak with her doctor again for some clarification on her medications and wanted to clarify her discharge. Patient was informed MD would be called and MD stated he would talk to patient tomorrow. Patient also wanted to let us know that she takes her [place] injection once monthly and does not need a second dose. Patient was appropriate and appreciate and stated she will talk to the doctor tomorrow.

## 2018-04-13 NOTE — BSMART NOTE
ACTIVITIES THERAPY PROGRESS NOTE    Group time:8922    The patient did not refuse, but, did not come to group.

## 2018-04-14 PROCEDURE — 65220000003 HC RM SEMIPRIVATE PSYCH

## 2018-04-14 NOTE — BH NOTES
GROUP THERAPY PROGRESS NOTE    Dianne Florian is not participating in Recreational Therapy. Group time: 1600    Personal goal for participation: fresh air break    Goal orientation: passive    Group therapy participation: refuse    Therapeutic interventions reviewed and discussed: Staff encouraged Pt to participate in group. Impression of participation: Pt refuse chose to rest in bed despite staff encouragement.

## 2018-04-14 NOTE — BH NOTES
Observed patient throughout the shift. Patient was encouraged by staff to participate in group session, but patient refuse. Later the patient sat in the group session and left to return to room. Patient has been pacing back and forth in room and has been seen just standing in the hallway starring at the unseen pretty frequently. Will continue to monitor patient's behavior and safety for the duration of the shift.

## 2018-04-14 NOTE — PROGRESS NOTES
Problem: Depressed Mood (Adult/Pediatric)  Goal: *STG: Remains safe in hospital  Will remain safe and not engage in any self injurious behaviors daily during her hospital stay. Outcome: Progressing Towards Goal  Remains safe  Goal: *STG: Complies with medication therapy  Will be compliant with scheduled medications daily as ordered during her hospitalization. Outcome: Progressing Towards Goal  Medications compliant. Problem: Altered Thought Process (Adult/Pediatric)  Goal: *STG: Decreased delusional thinking  Will have decreased or no delusional thinking daily during her hospital stay. Outcome: Not Progressing Towards Goal  delusional    Comments: Patient is pleasant, cooperative. Medication compliant. She is paranoid, continue to feel like someone is after her. Reassured that she was safe. Will continue to monitor.
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
## 2018-04-15 PROCEDURE — 74011250637 HC RX REV CODE- 250/637: Performed by: PSYCHIATRY & NEUROLOGY

## 2018-04-15 PROCEDURE — 65220000003 HC RM SEMIPRIVATE PSYCH

## 2018-04-15 RX ORDER — PSEUDOEPHEDRINE HCL 30 MG
30 TABLET ORAL
Status: DISCONTINUED | OUTPATIENT
Start: 2018-04-15 | End: 2018-04-17 | Stop reason: HOSPADM

## 2018-04-15 RX ADMIN — FLUPHENAZINE HYDROCHLORIDE 5 MG: 5 TABLET, FILM COATED ORAL at 18:45

## 2018-04-15 RX ADMIN — PSEUDOEPHEDRINE HCL 30 MG: 30 TABLET, COATED ORAL at 18:45

## 2018-04-15 RX ADMIN — LORAZEPAM 1 MG: 1 TABLET ORAL at 18:45

## 2018-04-15 NOTE — PROGRESS NOTES
Problem: Depressed Mood (Adult/Pediatric)  Goal: *STG: Attends activities and groups  Will attend at least 2-3 groups gaily during her hospitalization. Outcome: Progressing Towards Goal  Attending groups. Goal: *STG: Complies with medication therapy  Will be compliant with scheduled medications daily as ordered during her hospitalization. Outcome: Progressing Towards Goal  Medication compliant. Problem: Altered Thought Process (Adult/Pediatric)  Goal: *STG: Decreased delusional thinking  Will have decreased or no delusional thinking daily during her hospital stay. Outcome: Progressing Towards Goal  Less delusional     Comments: Patient has been in her room most of the day. She is less delusional, not focus on talking about someone was after her. Medication compliant. Voiced no complaints.

## 2018-04-15 NOTE — BH NOTES
Pt continue to state some one is out to get her. Pt isolated sleeping in bed this shift coming out for meals and medications. Pt. denies SI/HI, AV/H. Pt contracts for safety on the unit agree to come to staff if feeling harm to self or others. Pt.denies any new medical/pain complaints. Pt. ate 100% of meals and took scheduled medications. Pt. did not have any visitors. Staff encouraged Pt. to  participate in treatment,  medication and group therapy. Pt agreed. Pt. remain free of falls and provided non skid socks. Staff will continue to monitor Pt. for behavior safety and location.

## 2018-04-15 NOTE — BH NOTES
Patient was encouraged to leave bed and eat dinner. Patient came to nurses' station and reported the return/continuance of delusional thoughts and paranoia. Patient returned to room without meal, while food was heating up. Patient then stated, \"I don't have much of an appetite due to this cold\". Patient's meal was brought to room to encouraged to eat. Patient was offered prn medications: Sudafed, Prolixin, and Lorazepam, to assist with symptoms. Patient accepted and took medications after receiving education on them; will continue to monitor.

## 2018-04-15 NOTE — BH NOTES
GROUP THERAPY PROGRESS NOTE    Link Alfred is not participating in [place]. Staff encouraged and pt declined.

## 2018-04-15 NOTE — BH NOTES
Tyler Dalal  is not participating in Relaxation Group. Group time: 2302    Personal goal for participation: decrease anxiety    Goal orientation: relaxation    Group therapy participation: refuse    Therapeutic interventions reviewed and discussed: Activities to reduce anxiety    Impression of participation: Pt.  Refuse, chose to rest in bed despite staff encouragement

## 2018-04-15 NOTE — PROGRESS NOTES
[address]  Inpatient Progress Note     Date of Service: 04/15/18  Hospital Day: 8     Subjective/Interval History   04/15/18    Treatment Team Notes:  Notes reviewed and/or discussed and report that Fercho Brown is at baseline. Patient interview: Fercho Brown was interviewed by this writer today. Pt bargained about receiving another loading dose of [place]; she is adament that she did receive an [place] injection 3/14/18 with the [facility] in [place]. She is now interested in transitioning back to tablet, reasons are unclear. Also experiencing congestion. Less consumed by delusions. Objective     Visit Vitals    /71    Pulse 94    Temp 97.2 °F (36.2 °C)    Resp 18    Ht 5' 7\" (1.702 m)    Wt 90.7 kg (200 lb)    SpO2 94%    BMI 31.32 kg/m2       Mental Status Examination     Appearance/Hygiene 55 yo AAF  Good hygiene   Tired     Behavior/Social Relatedness Superficial  Non-aggressive     Musculoskeletal Gait/Station: appropriate  Tone (flaccid, cogwheeling, spastic): good upper extremity tone  Psychomotor (hyperkinetic, hypokinetic): appropriate   Involuntary movements (tics, dyskinesias, akathisa, stereotypies): none   Speech   Rate, rhythm, volume, fluency and articulation are appropriate   Mood   euthymic   Affect    bright   Thought Process Linear and goal directed   Thought Content and Perceptual Disturbances Persecutory delusions  Denies suicidal or homicidal ideation  Denies auditory/visual hallucinations. Sensorium and Cognition  Grossly intact   Insight  fair   Judgment fair        Assessment/Plan      Psychiatric Diagnoses:   Schizoaffective disorder, F25.9     Medical Diagnoses: HTN     Psychosocial and contextual factors: housing instability, questionable non-compliance on regimen     Level of impairment/disability: moderate     Fercho Brown is a 56 y.o. who is stabilizing, she is less distraught by delusions. Will continue [place] sequence. 1. Refused labs x2--fasting lipids, A1C and hepatic panel. Will try again Monday morning  2. Second Sustenna loading dose of Sustenna 156mg IM due Tuesday (4/17/18)   3. Start Sudafed for congestion. 4. Continue Vistaril PRN  5. Follow  Up with [address] [place] [address].   6. Tentative date of discharge: Tuesday (4/17/18)    Deloris Riley MD DR. [facility]  Psychiatry

## 2018-04-16 LAB
ALBUMIN SERPL-MCNC: 3.2 G/DL (ref 3.4–5)
ALBUMIN/GLOB SERPL: 0.8 {RATIO} (ref 0.8–1.7)
ALP SERPL-CCNC: 72 U/L (ref 45–117)
ALT SERPL-CCNC: 19 U/L (ref 13–56)
AST SERPL-CCNC: 17 U/L (ref 15–37)
BILIRUB DIRECT SERPL-MCNC: 0.2 MG/DL (ref 0–0.2)
BILIRUB SERPL-MCNC: 0.6 MG/DL (ref 0.2–1)
CHOLEST SERPL-MCNC: 218 MG/DL
EST. AVERAGE GLUCOSE BLD GHB EST-MCNC: 120 MG/DL
GLOBULIN SER CALC-MCNC: 3.9 G/DL (ref 2–4)
HBA1C MFR BLD: 5.8 % (ref 4.2–5.6)
HDLC SERPL-MCNC: 60 MG/DL (ref 40–60)
HDLC SERPL: 3.6 {RATIO} (ref 0–5)
LDLC SERPL CALC-MCNC: 143.2 MG/DL (ref 0–100)
LIPID PROFILE,FLP: ABNORMAL
PROT SERPL-MCNC: 7.1 G/DL (ref 6.4–8.2)
TRIGL SERPL-MCNC: 74 MG/DL (ref ?–150)
VLDLC SERPL CALC-MCNC: 14.8 MG/DL

## 2018-04-16 PROCEDURE — 80061 LIPID PANEL: CPT | Performed by: PSYCHIATRY & NEUROLOGY

## 2018-04-16 PROCEDURE — 83036 HEMOGLOBIN GLYCOSYLATED A1C: CPT | Performed by: PSYCHIATRY & NEUROLOGY

## 2018-04-16 PROCEDURE — 65220000003 HC RM SEMIPRIVATE PSYCH

## 2018-04-16 PROCEDURE — 36415 COLL VENOUS BLD VENIPUNCTURE: CPT | Performed by: PSYCHIATRY & NEUROLOGY

## 2018-04-16 PROCEDURE — 80076 HEPATIC FUNCTION PANEL: CPT | Performed by: PSYCHIATRY & NEUROLOGY

## 2018-04-16 NOTE — BSMART NOTE
OCCUPATIONAL THERAPY PROGRESS NOTE  Group Time:  2408  Attendance: The patient attended 1/2 of group. Participation:  The patient participated with minimal elaboration in the activity. Attention:  The patient needed redirection to activity at least once. Interaction:  The patient acknowledges others or responds to questions,  with no spontaneous interaction.

## 2018-04-16 NOTE — PROGRESS NOTES
[address]  Inpatient Progress Note     Date of Service: 04/16/18  Hospital Day: 9     Subjective/Interval History   04/16/18    Treatment Team Notes:  Notes reviewed and/or discussed and report that Link Alfred received Prolixin and Ativan for delusional thinking. Patient interview: Link Alfred was interviewed by this writer today. Pt reported that receiving Ativan yesterday was helpful. She is not interested in taking the medication daily, however, Liked Prolixin effects but prefers [place]. Denied EPS. She did not report any delusions this morning and stated she was not in distress or worried. Slept well. Objective     Visit Vitals    /75 (BP 1 Location: Right arm, BP Patient Position: Sitting)    Pulse (!) 104    Temp 97.2 °F (36.2 °C)    Resp 18    Ht 5' 7\" (1.702 m)    Wt 90.7 kg (200 lb)    SpO2 94%    BMI 31.32 kg/m2       Mental Status Examination     Appearance/Hygiene 57 yo AAF  Good hygiene    Behavior/Social Relatedness Superficial  Non-aggressive     Musculoskeletal Gait/Station: appropriate  Tone (flaccid, cogwheeling, spastic): good upper extremity tone  Psychomotor (hyperkinetic, hypokinetic): appropriate   Involuntary movements (tics, dyskinesias, akathisa, stereotypies): none   Speech   Rate, rhythm, volume, fluency and articulation are appropriate   Mood   euthymic   Affect    bright   Thought Process Linear and goal directed   Thought Content and Perceptual Disturbances Persecutory delusions  Denies suicidal or homicidal ideation  Denies auditory/visual hallucinations.     Sensorium and Cognition  Grossly intact   Insight  fair   Judgment fair        Assessment/Plan      Psychiatric Diagnoses:   Schizoaffective disorder, F25.9     Medical Diagnoses: HTN     Psychosocial and contextual factors: housing instability, questionable non-compliance on regimen     Level of impairment/disability: moderate     Link Alfred is a 56 y.o. who is stabilizing, she is less distraught by delusions. Will continue [place] sequence. 1. Cholesterol elevated at 218, AIC 5.8, and hepatic function wnl. recommend dietary changes. Needs outpatient follow up with PCP. 2. Second Sustenna loading dose of Sustenna 156mg IM due Tuesday (4/17/18)   3. Continue Sudafed for congestion. 4. Continue Vistaril PRN  5. Follow  Up with [address] [place] [address].   6. Tentative date of discharge: Tuesday (4/17/18)    Ivan Alvarez MD DR. [facility]  Psychiatry

## 2018-04-16 NOTE — BH NOTES
Pt. Has isolated sleeping this shift. Pt. denies SI/HI, AV/H. Pt contracts for safety on the unit agree to come to staff if feeling harm to self or others. Pt.denies any new medical/pain complaints. Pt. ate 100% of meals and took scheduled medications. Pt. did not have any visitors. Staff encouraged Pt. to  participate in treatment,  medication and group therapy. Pt agreed. Pt. remain free of falls and provided non skid socks. Staff will continue to monitor Pt. for behavior safety and location.

## 2018-04-16 NOTE — BH NOTES
Eugenia Weathers is not participating in [facility]. Group time: 0560    Personal goal for participation: Repairs. Community Concerns    Goal orientation: personal    Group therapy participation: refuse    Therapeutic interventions reviewed and discussed: Staff encouraged Pt to report repairs and Community concerns    Impression of participation: Pt refuse, chose to rest in bed despite staff encouragement

## 2018-04-16 NOTE — BH NOTES
Patient has been in her room for most of the shift except to come out for meals and medications. Patient denies SI/HI and AVH and is pleasant and cooperative on the unit but isolative.

## 2018-04-16 NOTE — BH NOTES
GROUP THERAPY PROGRESS NOTE    Rick Riddle is participating in [place]. Group time: 30 minutes    Personal goal for participation: WORK ON POSITIVE BEHAVIOR AND GOALS    Goal orientation: Changes to be made while in hospital    Group therapy participation: minimal    Therapeutic interventions reviewed and discussed:     Impression of participation: Pt sat quietly in group and voiced no major complaints or concerns.

## 2018-04-17 VITALS
TEMPERATURE: 96.6 F | OXYGEN SATURATION: 94 % | RESPIRATION RATE: 18 BRPM | HEART RATE: 104 BPM | DIASTOLIC BLOOD PRESSURE: 87 MMHG | BODY MASS INDEX: 31.39 KG/M2 | WEIGHT: 200 LBS | HEIGHT: 67 IN | SYSTOLIC BLOOD PRESSURE: 124 MMHG

## 2018-04-17 RX ORDER — HYDROXYZINE PAMOATE 25 MG/1
25 CAPSULE ORAL
Qty: 90 CAP | Refills: 0 | Status: SHIPPED | OUTPATIENT
Start: 2018-04-17

## 2018-04-17 NOTE — BH NOTES
GROUP THERAPY PROGRESS NOTE    Francisco J Jett is not  participating in [place]. Group time: 30 minutes  Did not participate,continues to say she has low energy because of cold.
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
## 2018-04-17 NOTE — DISCHARGE INSTRUCTIONS
Emergency Numbers    : [facility] Emergency Services: [phone]  Suicide Prevention Line: [phone] (TALK)      The following personal items collected during your admission are returned to you:   Dental Appliance: Dental Appliances: None  Vision: Visual Aid: None  Hearing Aid:    Jewelry: Jewelry: Bracelet, Earrings, Ring  Clothing: Clothing: Jacket/Coat, Pants, Shirt, Slippers, Undergarments  Other Valuables: Other Valuables: Money (comment), Purse, Other (comment)  Valuables sent to safe: The discharge information has been reviewed with the patient. The patient verbalized understanding. [employer] Activation    Thank you for requesting access to [employer]. Please follow the instructions below to securely access and download your online medical record. [employer] allows you to send messages to your doctor, view your test results, renew your prescriptions, schedule appointments, and more. How Do I Sign Up? In your internet browser, go to [email or web address]  Click on the First Time User? Click Here link in the Sign In box. You will be redirect to the New Member Sign Up page. Enter your [employer] Access Code exactly as it appears below. You will not need to use this code after youve completed the sign-up process. If you do not sign up before the expiration date, you must request a new code. Arvind Al

## 2018-04-17 NOTE — BSMART NOTE
SOCIAL WORK GROUP THERAPY PROGRESS NOTE    Group Time:  *11am  Group Topic:  Coping Skills    Group Participation:     Pt was unavailable for group due to choosing to stay in bed & not wanting to be around others especially with a lot of activity on unit. Support offered & staff reminder her of the sense of accomplishment with task completion as well as insight into the internal cognitive focus.

## 2018-04-17 NOTE — BSMART NOTE
Pt.is a 56year old female  with history of Schizoaffective Disorder, anxiety and PTSD.  Pt. Was admitted to this facility for medication non-compliance, paranoia  And thought blocking. D/C Plan: Pt . Stated she plans to return to the hotel in [address] [place] [address]. Pt stated she will reside with her father.   Cristian Piña. will follow-up with Navigation follow-up appointment for 5/1/18 @ 11:00 at [facility] at [address] [address] Phone: [phone]. Pt. Was provided housing information, in addition to [employer], and transitional housing. Pt. Plans to take a regular cab home per. Pt.'s request.      LLOYD Contact:   SW met with pt to discuss d/c planning. SW dicussed positive coping skill, continued medication compliance and safety plan. Pt. Is currently denying ideations and hallucinations. SW encouraged pt to continue to take medication and attend aftercare appointments in the community.          Pt. Decided to take the bus .

## 2018-04-17 NOTE — BH NOTES
Patient is being discharged off unit with her belongings, discharge paperwork and prescriptions. Patient was given a bus ticket for transportation.

## 2018-05-22 ENCOUNTER — HOSPITAL ENCOUNTER (EMERGENCY)
Age: 48
Discharge: HOME | End: 2018-05-23
Attending: EMERGENCY MEDICINE | Admitting: EMERGENCY MEDICINE
Payer: MEDICARE

## 2018-05-22 DIAGNOSIS — F25.9 SCHIZOAFFECTIVE DISORDER, UNSPECIFIED TYPE (HCC): Primary | ICD-10-CM

## 2018-05-22 PROCEDURE — 99283 EMERGENCY DEPT VISIT LOW MDM: CPT

## 2018-05-22 NOTE — Clinical Note
Please follow up with a doctor as discussed. The evaluation and treatment done today requires that you follow up with a physician for re-evaluation. Medical problems can change over time and symptoms can get worse or new symptoms can develop over time,  therefore, it is important that you follow up as we discussed. Please immediately return to the ER if you have any concerns. Call the ER if you have any questions about what we discussed.

## 2018-05-23 VITALS
HEART RATE: 85 BPM | DIASTOLIC BLOOD PRESSURE: 75 MMHG | SYSTOLIC BLOOD PRESSURE: 110 MMHG | TEMPERATURE: 98 F | RESPIRATION RATE: 16 BRPM | OXYGEN SATURATION: 100 % | WEIGHT: 210 LBS | BODY MASS INDEX: 32.89 KG/M2

## 2018-05-23 LAB
ALBUMIN SERPL-MCNC: 3.5 G/DL (ref 3.4–5)
ALBUMIN/GLOB SERPL: 0.7 {RATIO} (ref 0.8–1.7)
ALP SERPL-CCNC: 85 U/L (ref 45–117)
ALT SERPL-CCNC: 59 U/L (ref 13–56)
AMPHET UR QL SCN: NEGATIVE
ANION GAP SERPL CALC-SCNC: 9 MMOL/L (ref 3–18)
AST SERPL-CCNC: 22 U/L (ref 15–37)
BARBITURATES UR QL SCN: NEGATIVE
BASOPHILS # BLD: 0 K/UL (ref 0–0.1)
BASOPHILS NFR BLD: 0 % (ref 0–2)
BENZODIAZ UR QL: NEGATIVE
BILIRUB SERPL-MCNC: 0.3 MG/DL (ref 0.2–1)
BUN SERPL-MCNC: 10 MG/DL (ref 7–18)
BUN/CREAT SERPL: 9 (ref 12–20)
CALCIUM SERPL-MCNC: 9 MG/DL (ref 8.5–10.1)
CANNABINOIDS UR QL SCN: NEGATIVE
CHLORIDE SERPL-SCNC: 103 MMOL/L (ref 100–108)
CO2 SERPL-SCNC: 25 MMOL/L (ref 21–32)
COCAINE UR QL SCN: NEGATIVE
CREAT SERPL-MCNC: 1.16 MG/DL (ref 0.6–1.3)
DIFFERENTIAL METHOD BLD: ABNORMAL
EOSINOPHIL # BLD: 0.1 K/UL (ref 0–0.4)
EOSINOPHIL NFR BLD: 2 % (ref 0–5)
ERYTHROCYTE [DISTWIDTH] IN BLOOD BY AUTOMATED COUNT: 13.5 % (ref 11.6–14.5)
ETHANOL SERPL-MCNC: <3 MG/DL (ref 0–3)
GLOBULIN SER CALC-MCNC: 5.1 G/DL (ref 2–4)
GLUCOSE SERPL-MCNC: 112 MG/DL (ref 74–99)
HCG UR QL: NEGATIVE
HCT VFR BLD AUTO: 40.2 % (ref 35–45)
HDSCOM,HDSCOM: NORMAL
HGB BLD-MCNC: 13.4 G/DL (ref 12–16)
LYMPHOCYTES # BLD: 2.1 K/UL (ref 0.9–3.6)
LYMPHOCYTES NFR BLD: 40 % (ref 21–52)
MCH RBC QN AUTO: 28.4 PG (ref 24–34)
MCHC RBC AUTO-ENTMCNC: 33.3 G/DL (ref 31–37)
MCV RBC AUTO: 85.2 FL (ref 74–97)
METHADONE UR QL: NEGATIVE
MONOCYTES # BLD: 0.6 K/UL (ref 0.05–1.2)
MONOCYTES NFR BLD: 12 % (ref 3–10)
NEUTS SEG # BLD: 2.5 K/UL (ref 1.8–8)
NEUTS SEG NFR BLD: 46 % (ref 40–73)
OPIATES UR QL: NEGATIVE
PCP UR QL: NEGATIVE
PLATELET # BLD AUTO: 299 K/UL (ref 135–420)
PMV BLD AUTO: 9.5 FL (ref 9.2–11.8)
POTASSIUM SERPL-SCNC: 3.9 MMOL/L (ref 3.5–5.5)
PROT SERPL-MCNC: 8.6 G/DL (ref 6.4–8.2)
RBC # BLD AUTO: 4.72 M/UL (ref 4.2–5.3)
SODIUM SERPL-SCNC: 137 MMOL/L (ref 136–145)
WBC # BLD AUTO: 5.4 K/UL (ref 4.6–13.2)

## 2018-05-23 PROCEDURE — 85025 COMPLETE CBC W/AUTO DIFF WBC: CPT | Performed by: EMERGENCY MEDICINE

## 2018-05-23 PROCEDURE — 80307 DRUG TEST PRSMV CHEM ANLYZR: CPT | Performed by: EMERGENCY MEDICINE

## 2018-05-23 PROCEDURE — 80053 COMPREHEN METABOLIC PANEL: CPT | Performed by: EMERGENCY MEDICINE

## 2018-05-23 PROCEDURE — 81025 URINE PREGNANCY TEST: CPT | Performed by: EMERGENCY MEDICINE

## 2018-05-23 RX ORDER — LORAZEPAM 1 MG/1
1-2 TABLET ORAL
Status: CANCELLED | OUTPATIENT
Start: 2018-05-23

## 2018-05-23 RX ORDER — BENZTROPINE MESYLATE 1 MG/1
1-2 TABLET ORAL
Status: CANCELLED | OUTPATIENT
Start: 2018-05-23

## 2018-05-23 RX ORDER — BENZTROPINE MESYLATE 1 MG/ML
1-2 INJECTION INTRAMUSCULAR; INTRAVENOUS
Status: CANCELLED | OUTPATIENT
Start: 2018-05-23

## 2018-05-23 RX ORDER — IBUPROFEN 400 MG/1
400 TABLET ORAL
Status: CANCELLED | OUTPATIENT
Start: 2018-05-23

## 2018-05-23 RX ORDER — LORAZEPAM 2 MG/ML
1-2 INJECTION INTRAMUSCULAR
Status: CANCELLED | OUTPATIENT
Start: 2018-05-23

## 2018-05-23 RX ORDER — FLUPHENAZINE HYDROCHLORIDE 2.5 MG/ML
5 INJECTION, SOLUTION INTRAMUSCULAR
Status: CANCELLED | OUTPATIENT
Start: 2018-05-23

## 2018-05-23 RX ORDER — FLUPHENAZINE HYDROCHLORIDE 5 MG/1
5 TABLET ORAL
Status: CANCELLED | OUTPATIENT
Start: 2018-05-23

## 2018-05-23 NOTE — ED NOTES
8:00 AM : Pt care transferred from Dr. Juni Riggs  ,ED provider. History of patient complaint(s), available diagnostic reports and current treatment plan has been discussed thoroughly. Bedside rounding on patient occured : yes . Intended disposition of patient : TBD    11:24 AM  Pamela Saeed from crisis discussed available resources with patient. Patient agrees to plan for discharge and outpatient follow up with the [address] [place] [address].     -Re-evaluted the patient- she is calm and with normal mood and good insight.   -Results including eval with Crisis were discussed and reviewed with pt who understood the implications.   -We discussed the diagnosis, treatment, and plan. Next steps in close outpt care include: outpt follow up with psych    -They verbally convey understanding and agreement of the signs, symptoms, diagnosis, treatment and prognosis and additionally agree to follow up as discussed. All questions were answered, and we reviewed pertinent return precautions as seen in the discharge paperwork. Pt understood follow up instructions, and would return to the ED if any worsening or concerns. Problem List Items Addressed This Visit     Schizoaffective disorder [facility]) - Primary          Dispo: Discharged        Scribe Attestation     Kojo Drake acting as a scribe for and in the presence of Rosalba Mittal MD     May 23, 2018 at 11:23 AM       Provider Attestation:      I personally performed the services described in the documentation, reviewed the documentation, as recorded by the scribe in my presence, and it accurately and completely records my words and actions.  May 23, 2018 at 11:23 AM -Rosalba Mittal MD
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
## 2018-05-23 NOTE — ED NOTES
Rounds made. Pt sitting up in bed awake and alert. NAD noted. VSS. Safety precautions in place. Will continue to monitor.  Pt given PBJ and gingerale per request.

## 2018-05-23 NOTE — ED NOTES
Initially patient to be admitted to the [facility]. Patient now staying at this facility. Awaiting bed assignment.   Patient is currently without complaints or needs

## 2018-05-23 NOTE — ED NOTES
Received patient in room 16. Patient appears in no distress. Patient requesting an admission to [facility] for hallucinations and depression. Reviewed poc with patient. Questions encouraged and answered.   Patient vebralized an understanding

## 2018-05-23 NOTE — BSMART NOTE
Seen on TDO evaluation request by Cedric Espinal of [facility]. Per Katrina Gonzales patient is able to be voluntary for treatment. 1115: Met with patient in room 16 in the ER. Discussed options for treatment with patient as currently unable to admit to inpatient behavioral medicine at [facility]. Patient is still declining inpatient treatment at the [facility]. Offered to contact other local Psychiatric facilities for possible inpatient treatment. Patient stated that she is feeling better now and feels she can go home and follow up with the [facility] for medications. Patient denied feeling suicidal or homicidal. Was alert and oriented. Very pleasant and cooperative. Discussed with Dr. Grace Murillo plan to discharge home from the ER and patient to follow up with the [facility].

## 2018-05-23 NOTE — ED NOTES
Patient rsting quietly on stretcher. Awaiting completion of testing to be drawn and reported. Patient states that she just wants help. Reviewed [facility] [place] admission process with the patient. Questions encouraged and answered.   Patient verbalized an understanding
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
## 2019-01-07 ENCOUNTER — HOSPITAL ENCOUNTER (EMERGENCY)
Age: 49
Discharge: HOME | End: 2019-01-07
Attending: EMERGENCY MEDICINE
Payer: MEDICARE

## 2019-01-07 VITALS
BODY MASS INDEX: 35.94 KG/M2 | DIASTOLIC BLOOD PRESSURE: 74 MMHG | SYSTOLIC BLOOD PRESSURE: 118 MMHG | HEIGHT: 67 IN | OXYGEN SATURATION: 100 % | HEART RATE: 93 BPM | WEIGHT: 229 LBS | TEMPERATURE: 97.9 F | RESPIRATION RATE: 16 BRPM

## 2019-01-07 DIAGNOSIS — F22 PARANOID DELUSION (HCC): Primary | ICD-10-CM

## 2019-01-07 LAB
ALBUMIN SERPL-MCNC: 3.4 G/DL (ref 3.5–5)
ALBUMIN/GLOB SERPL: 0.7 {RATIO} (ref 1.1–2.2)
ALP SERPL-CCNC: 71 U/L (ref 45–117)
ALT SERPL-CCNC: 23 U/L (ref 12–78)
ANION GAP SERPL CALC-SCNC: 13 MMOL/L (ref 5–15)
APAP SERPL-MCNC: <2 UG/ML (ref 10–30)
AST SERPL-CCNC: 18 U/L (ref 15–37)
BASOPHILS # BLD: 0 K/UL (ref 0–0.1)
BASOPHILS NFR BLD: 0 % (ref 0–1)
BILIRUB SERPL-MCNC: 0.4 MG/DL (ref 0.2–1)
BUN SERPL-MCNC: 13 MG/DL (ref 6–20)
BUN/CREAT SERPL: 11 (ref 12–20)
CALCIUM SERPL-MCNC: 9.3 MG/DL (ref 8.5–10.1)
CHLORIDE SERPL-SCNC: 102 MMOL/L (ref 97–108)
CO2 SERPL-SCNC: 23 MMOL/L (ref 21–32)
CREAT SERPL-MCNC: 1.23 MG/DL (ref 0.55–1.02)
DIFFERENTIAL METHOD BLD: NORMAL
EOSINOPHIL # BLD: 0.2 K/UL (ref 0–0.4)
EOSINOPHIL NFR BLD: 3 % (ref 0–7)
ERYTHROCYTE [DISTWIDTH] IN BLOOD BY AUTOMATED COUNT: 13 % (ref 11.5–14.5)
ETHANOL SERPL-MCNC: <10 MG/DL
GLOBULIN SER CALC-MCNC: 4.9 G/DL (ref 2–4)
GLUCOSE SERPL-MCNC: 140 MG/DL (ref 65–100)
HCT VFR BLD AUTO: 40.2 % (ref 35–47)
HGB BLD-MCNC: 13.2 G/DL (ref 11.5–16)
IMM GRANULOCYTES # BLD: 0 K/UL (ref 0–0.04)
IMM GRANULOCYTES NFR BLD AUTO: 0 % (ref 0–0.5)
LYMPHOCYTES # BLD: 2.6 K/UL (ref 0.8–3.5)
LYMPHOCYTES NFR BLD: 36 % (ref 12–49)
MCH RBC QN AUTO: 28.2 PG (ref 26–34)
MCHC RBC AUTO-ENTMCNC: 32.8 G/DL (ref 30–36.5)
MCV RBC AUTO: 85.9 FL (ref 80–99)
MONOCYTES # BLD: 0.7 K/UL (ref 0–1)
MONOCYTES NFR BLD: 9 % (ref 5–13)
NEUTS SEG # BLD: 3.6 K/UL (ref 1.8–8)
NEUTS SEG NFR BLD: 51 % (ref 32–75)
NRBC # BLD: 0 K/UL (ref 0–0.01)
NRBC BLD-RTO: 0 PER 100 WBC
PLATELET # BLD AUTO: 326 K/UL (ref 150–400)
PMV BLD AUTO: 9.4 FL (ref 8.9–12.9)
POTASSIUM SERPL-SCNC: 3.8 MMOL/L (ref 3.5–5.1)
PROT SERPL-MCNC: 8.3 G/DL (ref 6.4–8.2)
RBC # BLD AUTO: 4.68 M/UL (ref 3.8–5.2)
SALICYLATES SERPL-MCNC: <1.7 MG/DL (ref 2.8–20)
SODIUM SERPL-SCNC: 138 MMOL/L (ref 136–145)
WBC # BLD AUTO: 7.1 K/UL (ref 3.6–11)

## 2019-01-07 PROCEDURE — 85025 COMPLETE CBC W/AUTO DIFF WBC: CPT

## 2019-01-07 PROCEDURE — 90791 PSYCH DIAGNOSTIC EVALUATION: CPT

## 2019-01-07 PROCEDURE — 80053 COMPREHEN METABOLIC PANEL: CPT

## 2019-01-07 PROCEDURE — 36415 COLL VENOUS BLD VENIPUNCTURE: CPT

## 2019-01-07 PROCEDURE — 74011250637 HC RX REV CODE- 250/637: Performed by: EMERGENCY MEDICINE

## 2019-01-07 PROCEDURE — 80307 DRUG TEST PRSMV CHEM ANLYZR: CPT

## 2019-01-07 PROCEDURE — 99284 EMERGENCY DEPT VISIT MOD MDM: CPT

## 2019-01-07 RX ORDER — HYDROXYZINE 25 MG/1
25 TABLET, FILM COATED ORAL
Qty: 20 TAB | Refills: 0 | Status: SHIPPED | OUTPATIENT
Start: 2019-01-07 | End: 2019-01-17

## 2019-01-07 RX ORDER — HYDROXYZINE 25 MG/1
25 TABLET, FILM COATED ORAL
Status: COMPLETED | OUTPATIENT
Start: 2019-01-07 | End: 2019-01-07

## 2019-01-07 RX ADMIN — HYDROXYZINE HYDROCHLORIDE 25 MG: 25 TABLET ORAL at 03:37
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
## 2019-01-07 NOTE — ED NOTES
Discharge instructions were given to the patient by Roseann Scott.  
 
The patient left the Emergency Department ambulatory, alert and oriented and in no acute distress with 1 prescription. The patient was encouraged to call or return to the ED for worsening issues or problems and was encouraged to schedule a follow up appointment for continuing care. The patient verbalized understanding of discharge instructions and prescriptions, all questions were answered. The patient has no further concerns at this time.
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
## 2019-01-07 NOTE — DISCHARGE INSTRUCTIONS
Patient Education        Learning About Delirium  What is delirium? Delirium is a sudden change in mental condition. It leads to confusion and unusual behavior. Delirium is also called acute confusional state. Delirium affects all age groups. It can result from problems that affect the brain, such as stroke. It can also happen after an infection or when using certain medicines. Pain may also cause the problem. Seeing delirium in a loved one can be scary and sad. But it will go away most of the time. It usually lasts hours to days. The doctor will look for a cause and take steps to treat it and keep your loved one comfortable. What are the symptoms? Symptoms of delirium usually develop over several hours to a few days. Symptoms may change and be more or less severe. Symptoms include:  · A short attention span. · Confusion. This is not knowing where you are, what time it is, or who others are. · Hallucinations. This usually is seeing or hearing things that are not really there. · Delusions. This is believing things that aren't true. · Illusions. This is making a mistake in what you think is real. For example, you think a child is crying, but it's a pillow. · Disorganized thinking. How is delirium treated? The doctor may:  · Find and treat the cause. This could be:  ? Not getting enough fluids. ? An infection. ? A medicine or combination of medicines. ? Another medical problem. · Prescribe a medicine. · Make the hospital room as quiet as possible. You may be able to help your loved one by being present and talking to and touching him or her. Follow-up care is a key part of your treatment and safety. Be sure to make and go to all appointments, and call your doctor if you are having problems. It's also a good idea to know your test results and keep a list of the medicines you take. Where can you learn more? Go to http://amisha-jina.info/.   Enter L542 in the search box to learn more about \"Learning About Delirium. \"  Current as of: December 7, 2017  Content Version: 11.8  © 6080-6541 [employer]. Care instructions adapted under license by [employer] (which disclaims liability or warranty for this information). If you have questions about a medical condition or this instruction, always ask your healthcare professional. [place] [address] any warranty or liability for your use of this information.
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
## 2020-02-08 NOTE — BSMART NOTE
Pt.is a 56year old female  with history of Schizoaffective Disorder, anxiety and PTSD. Pt. Was admitted to this facility for medication non-compliance, paranoia  And thought blocking. SW discussed case with the treating psychiatrist       SW Contact:   SW met with pt to discuss d/c planning. Pt. Expressed she might have been getting services such as transitional housing from the [facility].  Pt. appears to be unclear. Pt. is medication and treatment complaint. Pt was informed the [facility] currently has no beds available. Pt. Stated her sleep is improving. Pt stated she still has spiritual attacks from a person that is unseen. SW ask pt to elaborate on statement. Pt. Stated she did not want to get him worked up. SW engaged pt with reality orientation to address her fix delusion. Pt. Is pleasant  And anxious. Pt. Is denying ideations and hallucinations but appears to hear an unseen person. D/C Plan: Pt . Stated she plans to return to the [facility] in [place], [place]. Pt stated she will reside with her father. no back pain

## 2021-06-07 NOTE — BSMART NOTE
ACTIVITIES THERAPY PROGRESS NOTE    Group time:9107    The patient did not awaken/get up when called for group. Patient here today for left knee pain. Patient states that two weeks ago walking up the steps she heard a pop in the left knee. Patient took ibuprofen as needed for pain. Patient states that she went to [facility] [place] [facility] the next day and nothing was noted/found at the time of visit.             Electronically signed by Jaylyn Alfaro MA on 6/7/2021 at 9:50 AM

## 2023-03-24 ENCOUNTER — HOSPITAL ENCOUNTER (EMERGENCY)
Facility: HOSPITAL | Age: 53
Discharge: HOME | End: 2023-03-24
Attending: EMERGENCY MEDICINE

## 2023-03-24 ENCOUNTER — APPOINTMENT (EMERGENCY)
Dept: RADIOLOGY | Facility: HOSPITAL | Age: 53
End: 2023-03-24

## 2023-03-24 VITALS
BODY MASS INDEX: 33.8 KG/M2 | TEMPERATURE: 97.6 F | OXYGEN SATURATION: 99 % | HEART RATE: 93 BPM | RESPIRATION RATE: 18 BRPM | SYSTOLIC BLOOD PRESSURE: 128 MMHG | WEIGHT: 223 LBS | DIASTOLIC BLOOD PRESSURE: 78 MMHG | HEIGHT: 68 IN

## 2023-03-24 DIAGNOSIS — M62.838 TRAPEZIUS MUSCLE SPASM: Primary | ICD-10-CM

## 2023-03-24 RX ORDER — LIDOCAINE 50 MG/G
1 PATCH TOPICAL DAILY
Qty: 5 PATCH | Refills: 0 | Status: SHIPPED | OUTPATIENT
Start: 2023-03-24 | End: 2023-03-29

## 2023-03-24 RX ORDER — IBUPROFEN 800 MG/1
800 TABLET ORAL 3 TIMES DAILY
Qty: 21 TABLET | Refills: 0 | Status: SHIPPED | OUTPATIENT
Start: 2023-03-24

## 2023-03-24 RX ORDER — METHOCARBAMOL 500 MG/1
500 TABLET, FILM COATED ORAL 2 TIMES DAILY
Qty: 14 TABLET | Refills: 0 | Status: SHIPPED | OUTPATIENT
Start: 2023-03-24 | End: 2023-03-31

## 2023-03-24 RX ORDER — LIDOCAINE 50 MG/G
1 PATCH TOPICAL ONCE
Status: DISCONTINUED | OUTPATIENT
Start: 2023-03-24 | End: 2023-03-24 | Stop reason: HOSPADM

## 2023-03-24 RX ORDER — METHOCARBAMOL 500 MG/1
500 TABLET, FILM COATED ORAL ONCE
Status: COMPLETED | OUTPATIENT
Start: 2023-03-24 | End: 2023-03-24

## 2023-03-24 RX ORDER — IBUPROFEN 400 MG/1
800 TABLET ORAL ONCE
Status: COMPLETED | OUTPATIENT
Start: 2023-03-24 | End: 2023-03-24

## 2023-03-24 RX ADMIN — LIDOCAINE 5% 1 PATCH: 700 PATCH TOPICAL at 14:50

## 2023-03-24 RX ADMIN — METHOCARBAMOL 500 MG: 500 TABLET ORAL at 14:49

## 2023-03-24 RX ADMIN — IBUPROFEN 800 MG: 400 TABLET, FILM COATED ORAL at 14:50

## 2023-03-24 NOTE — ED PROVIDER NOTES
History  Chief Complaint   Patient presents with   • Neck Pain     Pt reports left sided neck and shoulder pain that started yesterday  States it is hard to turn her neck to the left, and when she lifts her left arm her left shoulder feels tight  Requesting mri  This is a 48 y/o female with PMH PTSD who presents to the ER today with left sided neck and shoulder pain x 2 days  Patient states the pain waxes and wanes and yesterday was a 10/10 but today is lower like a 4/10  She states she has difficulty moving her neck to the left side because of the pain  Denies any injury or heavy lifting but states she sits at a computer all day hunched over  She denies any fevers, chills, swelling, bruising, redness, numbness, tingling, weakness, chest pain, shortness of breath  Denies taking any medications for it as she lives at [facility] and needs a prescription  Denies history of neck or back injuries  History provided by:  Patient   used: No    Neck Pain  Pain location:  L side  Pain radiates to:  L shoulder  Pain severity:  Moderate  Duration:  2 days  Timing:  Intermittent  Progression:  Waxing and waning  Chronicity:  New  Ineffective treatments:  None tried  Associated symptoms: no chest pain, no fever, no headaches, no numbness, no tingling and no weakness        None       Past Medical History:   Diagnosis Date   • 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[address] )    • PTSD (post-traumatic stress disorder)        History reviewed  No pertinent surgical history  History reviewed  No pertinent family history  I have reviewed and agree with the history as documented  E-Cigarette/Vaping     E-Cigarette/Vaping Substances     Social History     Tobacco Use   • Smoking status: Never   • Smokeless tobacco: Never   Substance Use Topics   • Drug use: Never       Review of Systems   Constitutional: Negative for chills and fever  Respiratory: Negative for shortness of breath      Cardiovascular: Negative for chest pain    Musculoskeletal: Positive for neck pain  Skin: Negative for color change  Neurological: Negative for dizziness, tingling, facial asymmetry, weakness, light-headedness, numbness and headaches  Physical Exam  Physical Exam  Vitals and nursing note reviewed  Constitutional:       General: She is awake  Appearance: Normal appearance  She is well-developed  HENT:      Head: Normocephalic and atraumatic  Right Ear: External ear normal       Left Ear: External ear normal       Nose: Nose normal    Eyes:      General: No scleral icterus  Extraocular Movements: Extraocular movements intact  Conjunctiva/sclera: Conjunctivae normal       Pupils: Pupils are equal, round, and reactive to light  Neck:        Comments: Increased patient with lateral flexion and rotation to left  Tenderness to palpation of SCM/Trapezius area  No swelling, bruising, ecchymosis, erythema or crepitus noted  Cardiovascular:      Rate and Rhythm: Normal rate and regular rhythm  Heart sounds: Normal heart sounds, S1 normal and S2 normal  No murmur heard  No gallop  Pulmonary:      Effort: Pulmonary effort is normal       Breath sounds: Normal breath sounds  No wheezing, rhonchi or rales  Musculoskeletal:      Cervical back: No rigidity  Pain with movement and muscular tenderness present  No spinous process tenderness  Decreased range of motion  Comments: Full ROM of left shoulder  NV and sensation intact  Radial pulse +2  Drop arm test negative   Skin:     General: Skin is warm and dry  Neurological:      General: No focal deficit present  Mental Status: She is alert  Psychiatric:         Attention and Perception: Attention and perception normal          Mood and Affect: Mood normal          Behavior: Behavior normal  Behavior is cooperative           Vital Signs  ED Triage Vitals   Temperature Pulse Respirations Blood Pressure SpO2   03/24/23 1257 03/24/23 1257 03/24/23 1257 03/24/23 1257 03/24/23 1257   97 6 °F (36 4 °C) 91 18 120/74 100 %      Temp Source Heart Rate Source Patient Position - Orthostatic VS BP Location FiO2 (%)   03/24/23 1257 03/24/23 1257 03/24/23 1257 03/24/23 1257 --   Temporal Monitor Sitting Left arm       Pain Score       03/24/23 1450       5           Vitals:    03/24/23 1257   BP: 120/74   Pulse: 91   Patient Position - Orthostatic VS: Sitting         Visual Acuity      ED Medications  Medications   lidocaine (LIDODERM) 5 % patch 1 patch (1 patch Topical Medication Applied 3/24/23 1450)   ibuprofen (MOTRIN) tablet 800 mg (800 mg Oral Given 3/24/23 1450)   methocarbamol (ROBAXIN) tablet 500 mg (500 mg Oral Given 3/24/23 1449)       Diagnostic Studies  Results Reviewed     None                 XR shoulder 2+ views LEFT   ED Interpretation by Anya Castaneda PA-C (03/24 1512)   No acute osseous abnormality                 Procedures  Procedures         ED Course  ED Course as of 03/24/23 1534   Fri Mar 24, 2023   1533 Pain improving with robaxin, motrin and lidocaine patch  Stable for d/c with outpatient follow up                                             Medical Decision Making  48 y/o female here for left sided neck and shoulder pain x 2 days  Clinical diagnosis of muscular strain  Xray ordered to r/o bony abnormality, arthritis causing pain  Plan: sent patient prescription for lidocaine patches, ibuprofen and robaxin  She was given care instructions and told to follow up with her PCP as needed  She  was given strict return to ER precautions both verbally and in discharge papers  Patient verbalized understanding and agrees with plan  Amount and/or Complexity of Data Reviewed  Radiology: ordered and independent interpretation performed  Risk  Prescription drug management            Disposition  Final diagnoses:   Trapezius muscle spasm     Time reflects when diagnosis was documented in both MDM as applicable and the Disposition within this note     Time User Action Codes Description Comment    3/24/2023  3:26 PM [employer] Add [[id number]] Trapezius muscle spasm       ED Disposition     ED Disposition   Discharge    Condition   Stable    Date/Time   Fri Mar 24, 2023  3:26 PM    Comment   Elton Castillo discharge to home/self care  Follow-up Information     Follow up With Specialties Details Why Contact Info Additional Information    PCP  Call  As needed       [address] Emergency Department Emergency Medicine Go to  if you develop intense neck or shoulder pain that is worse or different than before, cant feel or move your legs, numbness, weakness, develop pelvic numbness, bowel or bladder incontinence, fevers, chest pain, shortness of breath [address] [facility] Emergency Department, [address] [facility] , [address]          Discharge Medication List as of 3/24/2023  3:29 PM      START taking these medications    Details   ibuprofen (MOTRIN) 800 mg tablet Take 1 tablet (800 mg total) by mouth 3 (three) times a day, Starting Fri 3/24/2023, Print      lidocaine (Lidoderm) 5 % Apply 1 patch topically over 12 hours daily for 5 days Remove & Discard patch within 12 hours or as directed by MD, Starting Fri 3/24/2023, Until Wed 3/29/2023, Print      methocarbamol (ROBAXIN) 500 mg tablet Take 1 tablet (500 mg total) by mouth 2 (two) times a day for 7 days, Starting Fri 3/24/2023, Until Fri 3/31/2023, Print             No discharge procedures on file      PDMP Review     None          ED Provider  Electronically Signed by           Sahil Gutierrez PA-C  03/24/23 7024

## 2023-03-24 NOTE — DISCHARGE INSTRUCTIONS
Take 500 mg robaxin every 12 hours for the next 5 days  Take ibuprofen every 8 hours for pain or tylenol every 4-6 hours for pain  Rest, use heating pads, ice on your back   Avoid heavy lifting for atleast a week  Lidocaine patches for 12 hours on and 12 hours off   Follow up with your PCP if symptoms persist despite these treatments  Return to the ER if you develop intense neck or shoulder pain that is worse or different than before, cant feel or move your legs, numbness, weakness, develop pelvic numbness, bowel or bladder incontinence, fevers, chest pain, shortness of breath
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
## 2023-07-12 ENCOUNTER — HOSPITAL ENCOUNTER (EMERGENCY)
Facility: HOSPITAL | Age: 53
Discharge: HOME | End: 2023-07-12
Attending: EMERGENCY MEDICINE
Payer: COMMERCIAL

## 2023-07-12 VITALS
OXYGEN SATURATION: 99 % | SYSTOLIC BLOOD PRESSURE: 115 MMHG | BODY MASS INDEX: 33.8 KG/M2 | TEMPERATURE: 97.7 F | HEART RATE: 107 BPM | WEIGHT: 223 LBS | HEIGHT: 68 IN | RESPIRATION RATE: 18 BRPM | DIASTOLIC BLOOD PRESSURE: 75 MMHG

## 2023-07-12 DIAGNOSIS — F41.9 ANXIETY: Primary | ICD-10-CM

## 2023-07-12 LAB
ETHANOL EXG-MCNC: 0 MG/DL
FLUAV RNA RESP QL NAA+PROBE: NEGATIVE
FLUBV RNA RESP QL NAA+PROBE: NEGATIVE
RSV RNA RESP QL NAA+PROBE: NEGATIVE
SARS-COV-2 RNA RESP QL NAA+PROBE: NEGATIVE

## 2023-07-12 PROCEDURE — 0241U HB NFCT DS VIR RESP RNA 4 TRGT: CPT | Performed by: EMERGENCY MEDICINE

## 2023-07-12 PROCEDURE — 82075 ASSAY OF BREATH ETHANOL: CPT | Performed by: EMERGENCY MEDICINE

## 2023-07-12 RX ORDER — PALIPERIDONE PALMITATE 234 MG/1.5ML
INJECTION INTRAMUSCULAR
COMMUNITY
Start: 2022-12-12 | End: 2023-12-13

## 2023-07-12 RX ORDER — HYDROXYZINE 50 MG/1
50 TABLET, FILM COATED ORAL 2 TIMES DAILY PRN
COMMUNITY

## 2023-07-12 RX ORDER — RISPERIDONE 1 MG/1
1 TABLET ORAL 2 TIMES DAILY
COMMUNITY

## 2023-07-12 NOTE — ED NOTES
This writer discussed the patients current presentation and recommended discharge plan with Dr. Anusha Encarnacion. They agree with the patient being discharged at this time with referrals and/or information about none at time. The patient was Instructed to follow up with their staff at [facility]. The patient was provided with referral information for: None at time. This writer and the patient completed a safety plan. The patient was provided with a copy of their safety plan with encouragement to utilize the plan following discharge. In addition, the patient was instructed to call local [facility] crisis, other crisis services, [facility] or to go to the nearest ER immediately if their situation changes at any time. This writer discussed discharge plans with the patient who agrees with and understands the discharge plans. SAFETY PLAN  Warning Signs (thoughts, images, mood, behavior, situations) of a potential crisis: no known triggers. Coping Skills (what can I do to take my mind off the problem, or to keep myself safe): Reading fashion magazines, reading biographies, searching the Internet. Outside Support (who can I reach out to for support and help): Sally, Nila Kimball, and Ebony Antonio.         National Suicide Prevention Hotline:  [facility] [address] [place] [address] [phone] - LVF Crisis/Mobile Crisis   [address]: [phone]  [facility]: [address] [place] [address] [address] [phone] - Crisis   [phone] - Peer Support Talk Line (1-9pm daily)  [phone] - Teen Support Talk Line (1-9pm daily)  [address] [place] [address]) [phone] - [facility]

## 2023-07-12 NOTE — DISCHARGE INSTRUCTIONS
This writer discussed the patients current presentation and recommended discharge plan with Dr. Elisabeth Lin. They agree with the patient being discharged at this time with referrals and/or information about none at time. The patient was Instructed to follow up with their staff at [facility]. The patient was provided with referral information for: None at time. This writer and the patient completed a safety plan. The patient was provided with a copy of their safety plan with encouragement to utilize the plan following discharge. In addition, the patient was instructed to call local [facility] crisis, other crisis services, [facility] or to go to the nearest ER immediately if their situation changes at any time. This writer discussed discharge plans with the patient who agrees with and understands the discharge plans. SAFETY PLAN  Warning Signs (thoughts, images, mood, behavior, situations) of a potential crisis: no known triggers. Coping Skills (what can I do to take my mind off the problem, or to keep myself safe): Reading fashion magazines, reading biographies, searching the Internet. Outside Support (who can I reach out to for support and help): Mom, Sue Lee, and Kika Balderas.         National Suicide Prevention Hotline:  [address] [place] [address] [phone] - LVF Crisis/Mobile Crisis   [address]: [phone]  [facility]: [address] [place] [address] [address] [facility] [address]  [address] [place], [place] [phone] - Crisis   [phone] - Peer Support Talk Line (1-9pm daily)  [phone] - Teen Support Talk Line (1-9pm daily)  [address] [place] [address] [address] [place] [address] [address] [place] [address] [address] [place] [address]) [phone] - [address] [facility]
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
## 2023-09-12 ENCOUNTER — APPOINTMENT (EMERGENCY)
Dept: CT IMAGING | Facility: HOSPITAL | Age: 53
End: 2023-09-12
Payer: COMMERCIAL

## 2023-09-12 ENCOUNTER — HOSPITAL ENCOUNTER (EMERGENCY)
Facility: HOSPITAL | Age: 53
Discharge: HOME | End: 2023-09-12
Attending: EMERGENCY MEDICINE
Payer: COMMERCIAL

## 2023-09-12 VITALS
DIASTOLIC BLOOD PRESSURE: 96 MMHG | WEIGHT: 222.66 LBS | HEART RATE: 90 BPM | OXYGEN SATURATION: 98 % | SYSTOLIC BLOOD PRESSURE: 156 MMHG | RESPIRATION RATE: 17 BRPM | TEMPERATURE: 98.1 F | HEIGHT: 67 IN | BODY MASS INDEX: 34.95 KG/M2

## 2023-09-12 DIAGNOSIS — R11.2 NAUSEA AND VOMITING: Primary | ICD-10-CM

## 2023-09-12 DIAGNOSIS — R42 LIGHTHEADEDNESS: ICD-10-CM

## 2023-09-12 LAB
ALBUMIN SERPL BCP-MCNC: 3.8 G/DL (ref 3.5–5)
ALP SERPL-CCNC: 58 U/L (ref 34–104)
ALT SERPL W P-5'-P-CCNC: 22 U/L (ref 7–52)
AMPHETAMINES SERPL QL SCN: NEGATIVE
ANION GAP SERPL CALCULATED.3IONS-SCNC: 6 MMOL/L
APAP SERPL-MCNC: <10 UG/ML (ref 10–20)
AST SERPL W P-5'-P-CCNC: 19 U/L (ref 13–39)
BARBITURATES UR QL: NEGATIVE
BASOPHILS # BLD AUTO: 0.03 THOUSANDS/ÂΜL (ref 0–0.1)
BASOPHILS NFR BLD AUTO: 1 % (ref 0–1)
BENZODIAZ UR QL: NEGATIVE
BILIRUB SERPL-MCNC: 0.48 MG/DL (ref 0.2–1)
BILIRUB UR QL STRIP: NEGATIVE
BUN SERPL-MCNC: 18 MG/DL (ref 5–25)
CALCIUM SERPL-MCNC: 9.3 MG/DL (ref 8.4–10.2)
CARDIAC TROPONIN I PNL SERPL HS: <2 NG/L
CHLORIDE SERPL-SCNC: 104 MMOL/L (ref 96–108)
CLARITY UR: CLEAR
CO2 SERPL-SCNC: 24 MMOL/L (ref 21–32)
COCAINE UR QL: NEGATIVE
COLOR UR: ABNORMAL
CREAT SERPL-MCNC: 1.15 MG/DL (ref 0.6–1.3)
EOSINOPHIL # BLD AUTO: 0.19 THOUSAND/ÂΜL (ref 0–0.61)
EOSINOPHIL NFR BLD AUTO: 3 % (ref 0–6)
ERYTHROCYTE [DISTWIDTH] IN BLOOD BY AUTOMATED COUNT: 12.9 % (ref 11.6–15.1)
ETHANOL SERPL-MCNC: <10 MG/DL
FLUAV RNA RESP QL NAA+PROBE: NEGATIVE
FLUBV RNA RESP QL NAA+PROBE: NEGATIVE
GFR SERPL CREATININE-BSD FRML MDRD: 54 ML/MIN/1.73SQ M
GLUCOSE SERPL-MCNC: 111 MG/DL (ref 65–140)
GLUCOSE UR STRIP-MCNC: NEGATIVE MG/DL
HCT VFR BLD AUTO: 39.9 % (ref 34.8–46.1)
HGB BLD-MCNC: 12.7 G/DL (ref 11.5–15.4)
HGB UR QL STRIP.AUTO: NEGATIVE
IMM GRANULOCYTES # BLD AUTO: 0.02 THOUSAND/UL (ref 0–0.2)
IMM GRANULOCYTES NFR BLD AUTO: 0 % (ref 0–2)
KETONES UR STRIP-MCNC: NEGATIVE MG/DL
LEUKOCYTE ESTERASE UR QL STRIP: NEGATIVE
LIPASE SERPL-CCNC: 39 U/L (ref 11–82)
LYMPHOCYTES # BLD AUTO: 2.1 THOUSANDS/ÂΜL (ref 0.6–4.47)
LYMPHOCYTES NFR BLD AUTO: 37 % (ref 14–44)
MCH RBC QN AUTO: 27.4 PG (ref 26.8–34.3)
MCHC RBC AUTO-ENTMCNC: 31.8 G/DL (ref 31.4–37.4)
MCV RBC AUTO: 86 FL (ref 82–98)
MONOCYTES # BLD AUTO: 0.69 THOUSAND/ÂΜL (ref 0.17–1.22)
MONOCYTES NFR BLD AUTO: 12 % (ref 4–12)
NEUTROPHILS # BLD AUTO: 2.59 THOUSANDS/ÂΜL (ref 1.85–7.62)
NEUTS SEG NFR BLD AUTO: 47 % (ref 43–75)
NITRITE UR QL STRIP: NEGATIVE
NRBC BLD AUTO-RTO: 0 /100 WBCS
OPIATES UR QL SCN: NEGATIVE
OXYCODONE+OXYMORPHONE UR QL SCN: NEGATIVE
PCP UR QL: NEGATIVE
PH UR STRIP.AUTO: 5.5 [PH]
PLATELET # BLD AUTO: 308 THOUSANDS/UL (ref 149–390)
PMV BLD AUTO: 9.3 FL (ref 8.9–12.7)
POTASSIUM SERPL-SCNC: 4 MMOL/L (ref 3.5–5.3)
PROT SERPL-MCNC: 6.9 G/DL (ref 6.4–8.4)
PROT UR STRIP-MCNC: NEGATIVE MG/DL
RBC # BLD AUTO: 4.64 MILLION/UL (ref 3.81–5.12)
RSV RNA RESP QL NAA+PROBE: NEGATIVE
SALICYLATES SERPL-MCNC: <5 MG/DL (ref 3–20)
SARS-COV-2 RNA RESP QL NAA+PROBE: NEGATIVE
SODIUM SERPL-SCNC: 134 MMOL/L (ref 135–147)
SP GR UR STRIP.AUTO: <1.005 (ref 1–1.03)
THC UR QL: NEGATIVE
UROBILINOGEN UR STRIP-ACNC: <2 MG/DL
WBC # BLD AUTO: 5.62 THOUSAND/UL (ref 4.31–10.16)

## 2023-09-12 PROCEDURE — 83690 ASSAY OF LIPASE: CPT | Performed by: EMERGENCY MEDICINE

## 2023-09-12 PROCEDURE — 93005 ELECTROCARDIOGRAM TRACING: CPT

## 2023-09-12 PROCEDURE — 80307 DRUG TEST PRSMV CHEM ANLYZR: CPT | Performed by: EMERGENCY MEDICINE

## 2023-09-12 PROCEDURE — 99284 EMERGENCY DEPT VISIT MOD MDM: CPT

## 2023-09-12 PROCEDURE — 99285 EMERGENCY DEPT VISIT HI MDM: CPT | Performed by: EMERGENCY MEDICINE

## 2023-09-12 PROCEDURE — 85025 COMPLETE CBC W/AUTO DIFF WBC: CPT | Performed by: EMERGENCY MEDICINE

## 2023-09-12 PROCEDURE — 82077 ASSAY SPEC XCP UR&BREATH IA: CPT | Performed by: EMERGENCY MEDICINE

## 2023-09-12 PROCEDURE — 84484 ASSAY OF TROPONIN QUANT: CPT | Performed by: EMERGENCY MEDICINE

## 2023-09-12 PROCEDURE — 80179 DRUG ASSAY SALICYLATE: CPT | Performed by: EMERGENCY MEDICINE

## 2023-09-12 PROCEDURE — 81003 URINALYSIS AUTO W/O SCOPE: CPT | Performed by: EMERGENCY MEDICINE

## 2023-09-12 PROCEDURE — 96360 HYDRATION IV INFUSION INIT: CPT

## 2023-09-12 PROCEDURE — 36415 COLL VENOUS BLD VENIPUNCTURE: CPT | Performed by: EMERGENCY MEDICINE

## 2023-09-12 PROCEDURE — 80143 DRUG ASSAY ACETAMINOPHEN: CPT | Performed by: EMERGENCY MEDICINE

## 2023-09-12 PROCEDURE — 0241U HB NFCT DS VIR RESP RNA 4 TRGT: CPT | Performed by: EMERGENCY MEDICINE

## 2023-09-12 PROCEDURE — 80053 COMPREHEN METABOLIC PANEL: CPT | Performed by: EMERGENCY MEDICINE

## 2023-09-12 RX ORDER — ONDANSETRON 2 MG/ML
4 INJECTION INTRAMUSCULAR; INTRAVENOUS ONCE
Status: DISCONTINUED | OUTPATIENT
Start: 2023-09-12 | End: 2023-09-12 | Stop reason: HOSPADM

## 2023-09-12 RX ADMIN — SODIUM CHLORIDE 1000 ML: 0.9 INJECTION, SOLUTION INTRAVENOUS at 12:38
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
## 2023-09-14 LAB
ATRIAL RATE: 102 BPM
P AXIS: 67 DEGREES
PR INTERVAL: 150 MS
QRS AXIS: 47 DEGREES
QRSD INTERVAL: 76 MS
QT INTERVAL: 362 MS
QTC INTERVAL: 471 MS
T WAVE AXIS: 36 DEGREES
VENTRICULAR RATE: 102 BPM

## 2023-09-14 PROCEDURE — 93010 ELECTROCARDIOGRAM REPORT: CPT | Performed by: INTERNAL MEDICINE

## 2024-10-07 NOTE — PROGRESS NOTES
Enema given, mom requesting x-ray, MD aware. Plan of care updated. All questions answered. Safety maintained. Call bell within reach. Problem: Falls - Risk of  Goal: *Absence of Falls  Document [place] Fall Risk and appropriate interventions in the flowsheet. Will be free of falls during her hospital stay. Fall Risk Interventions:     Keep room free from clutter    Teach pt to rise slowly                     Problem: Depressed Mood (Adult/Pediatric)  Goal: *STG: Participates in 1:1 therapy sessions  Will participate in 1:1 with staff daily and as needed to vent feelings throughout her hospital stay. Outcome: Progressing Towards Goal  Pt will participate in 1:1 therapy sessions daily during this admission. Goal: *STG: Attends activities and groups  Will attend at least 2-3 groups gaily during her hospitalization. Outcome: Progressing Towards Goal  Pt will attend 2-3 activities/groups daily during this admission. Goal: *STG: Remains safe in hospital  Will remain safe and not engage in any self injurious behaviors daily during her hospital stay. Outcome: Progressing Towards Goal  Pt will remain safe in hospital daily during this admission. Goal: *STG: Complies with medication therapy  Will be compliant with scheduled medications daily as ordered during her hospitalization. Outcome: Progressing Towards Goal  Pt will comply with medication therapy daily during this admission. Problem: Altered Thought Process (Adult/Pediatric)  Goal: *STG: Decreased delusional thinking  Will have decreased or no delusional thinking daily during her hospital stay. Outcome: Progressing Towards Goal  Pt will have decrease delusional thinking daily during this admission. Goal: *STG: Decreased hallucinations  Will have decreased or no hallucinations daily during her hospitalization. Outcome: Progressing Towards Goal  Pt will have decrease hallucinations daily during this admission.   Goal: *STG: Demonstrates ability to understand and use improved judgment in daily activities and relationships  Will have improved thought process and judgement each day during her hospitalization. Outcome: Progressing Towards Goal  Pt will demonstrate ability to understand and use improved judgement in daily activities and relationships daily during this admission. Problem: Nutrition Deficit  Goal: *Optimize nutritional status  Po intake of meals will meet >75% of patient estimated nutritional needs within the next 7 days. Weight loss of 1-2 lbs over the next 7 days. Outcome: Progressing Towards Goal  Pt will consume 75% of meal to meet nutritional needs within next 7 days during this admission. Comments: Pt has been isolative to room. She denies suicidal/homicidal ideations and AV hallucinations. She stated she came here because she wasn't safe. She didn't have visitors. She ate dinner. Nursing will provide a safe and supportive environment.